# Patient Record
Sex: MALE | Race: WHITE | Employment: FULL TIME | URBAN - METROPOLITAN AREA
[De-identification: names, ages, dates, MRNs, and addresses within clinical notes are randomized per-mention and may not be internally consistent; named-entity substitution may affect disease eponyms.]

---

## 2018-05-25 ENCOUNTER — HOSPITAL ENCOUNTER (EMERGENCY)
Age: 48
Discharge: HOME | End: 2018-05-25
Payer: COMMERCIAL

## 2018-05-25 VITALS
DIASTOLIC BLOOD PRESSURE: 88 MMHG | RESPIRATION RATE: 16 BRPM | BODY MASS INDEX: 29.53 KG/M2 | OXYGEN SATURATION: 97 % | SYSTOLIC BLOOD PRESSURE: 125 MMHG | WEIGHT: 200 LBS | HEART RATE: 100 BPM | TEMPERATURE: 98.4 F

## 2018-05-25 DIAGNOSIS — J20.9 ACUTE BRONCHITIS, UNSPECIFIED ORGANISM: Primary | ICD-10-CM

## 2018-05-25 PROCEDURE — 99212 OFFICE O/P EST SF 10 MIN: CPT

## 2018-05-25 RX ORDER — AZITHROMYCIN 250 MG/1
TABLET, FILM COATED ORAL
Qty: 1 PACKET | Refills: 0 | Status: SHIPPED | OUTPATIENT
Start: 2018-05-25 | End: 2018-06-04

## 2018-05-25 RX ORDER — BENZONATATE 200 MG/1
200 CAPSULE ORAL 3 TIMES DAILY PRN
Qty: 15 CAPSULE | Refills: 0 | Status: SHIPPED | OUTPATIENT
Start: 2018-05-25 | End: 2019-03-21

## 2018-05-25 ASSESSMENT — PAIN DESCRIPTION - LOCATION: LOCATION: THROAT

## 2018-05-25 ASSESSMENT — PAIN DESCRIPTION - DESCRIPTORS: DESCRIPTORS: SORE

## 2018-05-25 ASSESSMENT — PAIN SCALES - GENERAL: PAINLEVEL_OUTOF10: 6

## 2018-11-18 ENCOUNTER — HOSPITAL ENCOUNTER (EMERGENCY)
Age: 48
Discharge: HOME | End: 2018-11-18
Payer: COMMERCIAL

## 2018-11-18 VITALS
DIASTOLIC BLOOD PRESSURE: 104 MMHG | WEIGHT: 210 LBS | SYSTOLIC BLOOD PRESSURE: 152 MMHG | TEMPERATURE: 98.7 F | OXYGEN SATURATION: 96 % | HEART RATE: 79 BPM | RESPIRATION RATE: 16 BRPM | BODY MASS INDEX: 31.01 KG/M2

## 2018-11-18 DIAGNOSIS — G43.809 OTHER MIGRAINE WITHOUT STATUS MIGRAINOSUS, NOT INTRACTABLE: Primary | ICD-10-CM

## 2018-11-18 PROCEDURE — 99212 OFFICE O/P EST SF 10 MIN: CPT

## 2018-11-18 PROCEDURE — 96372 THER/PROPH/DIAG INJ SC/IM: CPT

## 2018-11-18 PROCEDURE — 6370000000 HC RX 637 (ALT 250 FOR IP): Performed by: NURSE PRACTITIONER

## 2018-11-18 PROCEDURE — 6360000002 HC RX W HCPCS: Performed by: NURSE PRACTITIONER

## 2018-11-18 RX ORDER — DIPHENHYDRAMINE HCL 25 MG
25 TABLET ORAL ONCE
Status: COMPLETED | OUTPATIENT
Start: 2018-11-18 | End: 2018-11-18

## 2018-11-18 RX ORDER — KETOROLAC TROMETHAMINE 30 MG/ML
30 INJECTION, SOLUTION INTRAMUSCULAR; INTRAVENOUS ONCE
Status: COMPLETED | OUTPATIENT
Start: 2018-11-18 | End: 2018-11-18

## 2018-11-18 RX ORDER — METOCLOPRAMIDE 5 MG/1
10 TABLET ORAL ONCE
Status: COMPLETED | OUTPATIENT
Start: 2018-11-18 | End: 2018-11-18

## 2018-11-18 RX ADMIN — KETOROLAC TROMETHAMINE 30 MG: 30 INJECTION, SOLUTION INTRAMUSCULAR at 14:05

## 2018-11-18 RX ADMIN — DIPHENHYDRAMINE HCL 25 MG: 25 TABLET ORAL at 14:05

## 2018-11-18 RX ADMIN — METOCLOPRAMIDE HYDROCHLORIDE 10 MG: 5 TABLET ORAL at 14:05

## 2018-11-18 ASSESSMENT — PAIN DESCRIPTION - LOCATION
LOCATION: HEAD
LOCATION: HEAD

## 2018-11-18 ASSESSMENT — PAIN SCALES - GENERAL
PAINLEVEL_OUTOF10: 6
PAINLEVEL_OUTOF10: 6
PAINLEVEL_OUTOF10: 2

## 2018-11-18 ASSESSMENT — PAIN DESCRIPTION - PROGRESSION: CLINICAL_PROGRESSION: RAPIDLY IMPROVING

## 2018-11-18 ASSESSMENT — PAIN DESCRIPTION - DESCRIPTORS
DESCRIPTORS: HEADACHE
DESCRIPTORS: HEADACHE

## 2018-11-18 NOTE — ED PROVIDER NOTES
HPI:  11/18/18, Time: 2:30 PM         Nicole Contreras is a 57 y.o. male presenting to the ED for evaluation. He gets migraines. He said this current headache for 5 days it's on the top of his head where his migraines usually hit he said he's taken Maxalt and over-the-counter medicines and nothing is taking away the headache pain he thought maybe he had a sinus infection because sometimes he gets headaches and sinus infections however he does not have any cold symptoms does not have a sore throat does not of nasal discharge or congestion. He's denying any blurred vision, nausea vomiting dizziness confusion or any other complaints. He said it feels like a typical migraine for him it's been a gradual onset gradually getting worse      Review of Systems:   Pertinent positives and negatives are stated within HPI, all other systems reviewed and are negative.          --------------------------------------------- PAST HISTORY ---------------------------------------------  Past Medical History:  has a past medical history of Acid reflux and Hiatal hernia. Past Surgical History:  has a past surgical history that includes sinus surgery. Social History:  reports that he has never smoked. He does not have any smokeless tobacco history on file. He reports that he drinks about 0.6 oz of alcohol per week . He reports that he does not use drugs. Family History: family history is not on file. The patients home medications have been reviewed. Allergies: Levaquin [levofloxacin]    -------------------------------------------------- RESULTS -------------------------------------------------  All laboratory and radiology results have been personally reviewed by myself   LABS:  No results found for this visit on 11/18/18.     RADIOLOGY:  Interpreted by Radiologist.  No orders to display       ------------------------- NURSING NOTES AND VITALS REVIEWED ---------------------------   The nursing notes within the ED encounter and vital signs as below have been reviewed. BP (!) 152/104   Pulse 79   Temp 98.7 °F (37.1 °C) (Oral)   Resp 16   Wt 210 lb (95.3 kg)   SpO2 96%   BMI 31.01 kg/m²   Oxygen Saturation Interpretation: Normal      ---------------------------------------------------PHYSICAL EXAM--------------------------------------      Constitutional/General: Alert and oriented x3, well appearing, non toxic in NAD  Head: Normocephalic and atraumatic, no tenderness over the sinuses  Eyes: PERRL, EOMI  Mouth: Oropharynx clear, handling secretions, no trismus  Neck: Supple, full ROM, no meningeal signs, no lymphadenopathy  Pulmonary: Lungs clear to auscultation bilaterally, no wheezes, rales, or rhonchi. Not in respiratory distress  Cardiovascular:  Regular rate and rhythm, no murmurs, gallops, or rubs. 2+ distal pulses  Abdomen: Soft, non tender, non distended,   Extremities: Moves all extremities x 4. Warm and well perfused  Skin: warm and dry without rash  Neurologic: GCS 15,  Psych: Normal Affect      ------------------------------ ED COURSE/MEDICAL DECISION MAKING----------------------  Medications   ketorolac (TORADOL) injection 30 mg (30 mg Intramuscular Given 11/18/18 1405)   metoclopramide (REGLAN) tablet 10 mg (10 mg Oral Given 11/18/18 1405)   diphenhydrAMINE (BENADRYL) tablet 25 mg (25 mg Oral Given 11/18/18 1405)         ED COURSE:       Medical Decision Making:    Patient is had a migraine for 5 days he wanted to make sure it was in a sinus infection, I do not see any evidence of a sinus infection he has no nasal congestion he has no tenderness over his sinuses he is no drainage. He does get migraines. He said that it's wort his migraines always start on top of his head he states in his Maxalt and ibuprofen and it hasn't taken away the pain. I did give him Toradol 30 mg IM along with Reglan 10 and Benadryl 25.   His blood pressure is also elevated he said he has an appointment this Friday to have his

## 2019-03-21 ENCOUNTER — HOSPITAL ENCOUNTER (EMERGENCY)
Age: 49
Discharge: HOME | End: 2019-03-21
Payer: COMMERCIAL

## 2019-03-21 VITALS
WEIGHT: 210 LBS | OXYGEN SATURATION: 99 % | TEMPERATURE: 98.3 F | DIASTOLIC BLOOD PRESSURE: 88 MMHG | SYSTOLIC BLOOD PRESSURE: 125 MMHG | RESPIRATION RATE: 20 BRPM | HEART RATE: 71 BPM | BODY MASS INDEX: 31.01 KG/M2

## 2019-03-21 DIAGNOSIS — J20.9 ACUTE BRONCHITIS, UNSPECIFIED ORGANISM: ICD-10-CM

## 2019-03-21 DIAGNOSIS — J01.00 ACUTE NON-RECURRENT MAXILLARY SINUSITIS: Primary | ICD-10-CM

## 2019-03-21 PROCEDURE — 99212 OFFICE O/P EST SF 10 MIN: CPT

## 2019-03-21 RX ORDER — AMOXICILLIN AND CLAVULANATE POTASSIUM 875; 125 MG/1; MG/1
1 TABLET, FILM COATED ORAL 2 TIMES DAILY
Qty: 14 TABLET | Refills: 0 | Status: SHIPPED | OUTPATIENT
Start: 2019-03-21 | End: 2019-03-28

## 2019-03-21 RX ORDER — BENZONATATE 200 MG/1
200 CAPSULE ORAL 3 TIMES DAILY PRN
Qty: 15 CAPSULE | Refills: 0 | Status: SHIPPED | OUTPATIENT
Start: 2019-03-21 | End: 2019-12-02 | Stop reason: ALTCHOICE

## 2019-03-21 ASSESSMENT — PAIN SCALES - GENERAL: PAINLEVEL_OUTOF10: 4

## 2019-03-21 ASSESSMENT — PAIN DESCRIPTION - LOCATION: LOCATION: HEAD

## 2019-03-21 ASSESSMENT — PAIN DESCRIPTION - DESCRIPTORS: DESCRIPTORS: HEADACHE

## 2019-06-09 ENCOUNTER — HOSPITAL ENCOUNTER (EMERGENCY)
Age: 49
Discharge: HOME | End: 2019-06-09
Payer: COMMERCIAL

## 2019-06-09 VITALS
TEMPERATURE: 99.8 F | DIASTOLIC BLOOD PRESSURE: 78 MMHG | HEART RATE: 101 BPM | SYSTOLIC BLOOD PRESSURE: 118 MMHG | RESPIRATION RATE: 20 BRPM | BODY MASS INDEX: 31.01 KG/M2 | WEIGHT: 210 LBS | OXYGEN SATURATION: 98 %

## 2019-06-09 DIAGNOSIS — J01.90 ACUTE SINUSITIS, RECURRENCE NOT SPECIFIED, UNSPECIFIED LOCATION: Primary | ICD-10-CM

## 2019-06-09 PROCEDURE — 99212 OFFICE O/P EST SF 10 MIN: CPT

## 2019-06-09 RX ORDER — AMOXICILLIN AND CLAVULANATE POTASSIUM 875; 125 MG/1; MG/1
1 TABLET, FILM COATED ORAL 2 TIMES DAILY
Qty: 20 TABLET | Refills: 0 | Status: SHIPPED | OUTPATIENT
Start: 2019-06-09 | End: 2019-06-19

## 2019-06-09 RX ORDER — BROMPHENIRAMINE MALEATE, PSEUDOEPHEDRINE HYDROCHLORIDE, AND DEXTROMETHORPHAN HYDROBROMIDE 2; 30; 10 MG/5ML; MG/5ML; MG/5ML
10 SYRUP ORAL 4 TIMES DAILY PRN
Qty: 140 ML | Refills: 0 | Status: SHIPPED | OUTPATIENT
Start: 2019-06-09 | End: 2019-06-19

## 2019-06-09 NOTE — ED PROVIDER NOTES
Department of Emergency Medicine   [address] [place] [address]  Provider Note  Admit Date/RoomTime: 6/9/2019  9:06 AM  Room: 02/02  Chief Complaint   Sinusitis (x 1 week w c/o sinus pressure, congestion and drainage w sore throat - dry cough -fatigue/bodyaches)    History of Present Illness   Source of history provided by:  patient. History/Exam Limitations: none. Ari Armstrong is a 57 y.o. old male who has a past medical history of:   Past Medical History:   Diagnosis Date    Acid reflux     Hiatal hernia     Hyperlipidemia     Hypertension    presents to the urgent care center by private vehicle, for sinus pressure and pain and ear pressure sore throat drainage and cough. He's been sick for one week. He said that  He has now  started to feel achy and feverish. He does not have any neck stiffness chest pain or shortness of breath. ROS    Pertinent positives and negatives are stated within HPI, all other systems reviewed and are negative. Past Surgical History:   Procedure Laterality Date    SINUS SURGERY      2006   Social History:  reports that he has never smoked. He has never used smokeless tobacco. He reports that he drinks about 0.6 oz of alcohol per week. He reports that he does not use drugs. Family History: family history is not on file. Allergies: Levaquin [levofloxacin]    Physical Exam            ED Triage Vitals [06/09/19 0908]   BP Temp Temp Source Pulse Resp SpO2 Height Weight   118/78 99.8 °F (37.7 °C) Oral 101 20 98 % -- 210 lb (95.3 kg)      Oxygen Saturation Interpretation: Normal.    Constitutional:  Alert, development consistent with age. Ears:  External Ears: Bilateral normal.               TM's & External Canals: normal appearance, normal TMs bilaterally. Nose:   There is no discharge, swelling or lesions noted. Sinuses: mild Bilateral maxillary sinus tenderness. no Bilateral frontal sinus tenderness.   Mouth:  normal tongue and buccal mucosa. Throat: mild erythema. Airway Patent. Neck/Lymphatics:  Neck Supple. There is no  anterior cervical node tenderness. Respiratory:   Breath sounds: Bilateral normal.  Lung sounds: normal.   CV:  Regular rate and rhythm, normal heart sounds, without pathological murmurs, ectopy, gallops, or rubs. GI:  Abdomen Soft, nontender, good bowel sounds. No firm or pulsatile mass. Integument:  Normal turgor. Warm, dry, without visible rash. Neurological:  Oriented. Motor functions intact. Lab / Imaging Results   (All laboratory and radiology results have been personally reviewed by myself)  Labs:  No results found for this visit on 06/09/19. Imaging: All Radiology results interpreted by Radiologist unless otherwise noted. No orders to display     ED Course / Medical Decision Making   Medications - No data to display       MDM:      Patient here for evaluation-- has sinusitis. I did put him on Augmentin and Bromfed. If he doesn't improve or worsens he should get reevaluated     Plan of Care: Normal progression of disease discussed. All questions answered. Explained the rationale for symptomatic treatment  Instruction provided in the use of fluids, vaporizer, acetaminophen, and other OTC medication for symptom control. Extra fluids  Analgesics as needed, dose reviewed. Follow up as needed should symptoms fail to improve. Counseling:   I have  spoken with the patient and discussed todays results, in addition to providing specific details for the plan of care and counseling regarding the diagnosis and prognosis. Questions are answered at this time and they are agreeable with the plan. Assessment      1.  Acute sinusitis, recurrence not specified, unspecified location      Plan   Discharge to home and advised to contact Kyle Larry MD  [address] [facility]  [phone]      As needed   Patient condition is good    New Medications     New Prescriptions    AMOXICILLIN-CLAVULANATE (AUGMENTIN) 875-125 MG PER TABLET    Take 1 tablet by mouth 2 times daily for 10 days    BROMPHENIRAMINE-PSEUDOEPHEDRINE-DM 2-30-10 MG/5ML SYRUP    Take 10 mLs by mouth 4 times daily as needed for Congestion or Cough     Electronically signed by UMAIR Quiros CNP   DD: 6/9/19  **This report was transcribed using voice recognition software. Every effort was made to ensure accuracy; however, inadvertent computerized transcription errors may be present.   END OF ED PROVIDER NOTE     UMAIR Quiros CNP  06/09/19 [id number]

## 2019-07-24 LAB
AVERAGE GLUCOSE: NORMAL
CHOLESTEROL, TOTAL: 188 MG/DL
CHOLESTEROL/HDL RATIO: 4.1
CREATININE: 1.1 MG/DL
HBA1C MFR BLD: 5.3 %
HDLC SERPL-MCNC: 46 MG/DL (ref 35–70)
LDL CHOLESTEROL CALCULATED: 101 MG/DL (ref 0–160)
NONHDLC SERPL-MCNC: NORMAL MG/DL
POTASSIUM (K+): 4.3
TRIGL SERPL-MCNC: 204 MG/DL
VLDLC SERPL CALC-MCNC: NORMAL MG/DL

## 2019-12-02 ENCOUNTER — HOSPITAL ENCOUNTER (EMERGENCY)
Age: 49
Discharge: HOME | End: 2019-12-02
Payer: COMMERCIAL

## 2019-12-02 VITALS
RESPIRATION RATE: 18 BRPM | WEIGHT: 215 LBS | DIASTOLIC BLOOD PRESSURE: 90 MMHG | HEART RATE: 65 BPM | BODY MASS INDEX: 31.84 KG/M2 | TEMPERATURE: 98.6 F | OXYGEN SATURATION: 96 % | SYSTOLIC BLOOD PRESSURE: 133 MMHG | HEIGHT: 69 IN

## 2019-12-02 DIAGNOSIS — M54.32 SCIATICA OF LEFT SIDE: Primary | ICD-10-CM

## 2019-12-02 PROCEDURE — 99212 OFFICE O/P EST SF 10 MIN: CPT

## 2019-12-02 RX ORDER — NAPROXEN 500 MG/1
500 TABLET ORAL 2 TIMES DAILY PRN
Qty: 20 TABLET | Refills: 0 | Status: SHIPPED | OUTPATIENT
Start: 2019-12-02 | End: 2021-02-15 | Stop reason: ALTCHOICE

## 2019-12-02 RX ORDER — CYCLOBENZAPRINE HCL 10 MG
10 TABLET ORAL 2 TIMES DAILY PRN
Qty: 14 TABLET | Refills: 0 | Status: SHIPPED | OUTPATIENT
Start: 2019-12-02 | End: 2019-12-09

## 2019-12-02 RX ORDER — METHYLPREDNISOLONE 4 MG/1
TABLET ORAL
Qty: 1 KIT | Refills: 0 | Status: SHIPPED | OUTPATIENT
Start: 2019-12-02 | End: 2019-12-08

## 2019-12-02 ASSESSMENT — PAIN DESCRIPTION - ONSET: ONSET: GRADUAL

## 2019-12-02 ASSESSMENT — PAIN SCALES - GENERAL: PAINLEVEL_OUTOF10: 8

## 2019-12-02 ASSESSMENT — PAIN DESCRIPTION - DESCRIPTORS: DESCRIPTORS: ACHING;SHOOTING

## 2019-12-02 ASSESSMENT — PAIN DESCRIPTION - FREQUENCY: FREQUENCY: CONTINUOUS

## 2019-12-02 ASSESSMENT — PAIN DESCRIPTION - PROGRESSION: CLINICAL_PROGRESSION: GRADUALLY WORSENING

## 2019-12-02 ASSESSMENT — PAIN DESCRIPTION - LOCATION: LOCATION: BACK;LEG

## 2019-12-02 ASSESSMENT — PAIN DESCRIPTION - ORIENTATION: ORIENTATION: LEFT;LOWER

## 2019-12-02 ASSESSMENT — PAIN DESCRIPTION - PAIN TYPE: TYPE: ACUTE PAIN

## 2020-02-26 LAB
CHOLESTEROL, TOTAL: 221 MG/DL
CHOLESTEROL/HDL RATIO: 5.1
CREATININE: 1.1 MG/DL
HDLC SERPL-MCNC: 43 MG/DL (ref 35–70)
LDL CHOLESTEROL CALCULATED: 128 MG/DL (ref 0–160)
NONHDLC SERPL-MCNC: NORMAL MG/DL
POTASSIUM (K+): 4.1
TRIGL SERPL-MCNC: 252 MG/DL
VLDLC SERPL CALC-MCNC: 50 MG/DL

## 2020-06-17 ENCOUNTER — TELEPHONE (OUTPATIENT)
Dept: SLEEP CENTER | Age: 50
End: 2020-06-17

## 2020-07-06 ENCOUNTER — TELEPHONE (OUTPATIENT)
Dept: SLEEP CENTER | Age: 50
End: 2020-07-06

## 2020-08-26 VITALS
RESPIRATION RATE: 17 BRPM | BODY MASS INDEX: 32.88 KG/M2 | DIASTOLIC BLOOD PRESSURE: 74 MMHG | SYSTOLIC BLOOD PRESSURE: 110 MMHG | HEART RATE: 68 BPM | WEIGHT: 222 LBS | HEIGHT: 69 IN

## 2021-01-05 ENCOUNTER — TELEPHONE (OUTPATIENT)
Dept: FAMILY MEDICINE CLINIC | Age: 51
End: 2021-01-05

## 2021-02-15 ENCOUNTER — APPOINTMENT (OUTPATIENT)
Dept: GENERAL RADIOLOGY | Age: 51
End: 2021-02-15
Payer: COMMERCIAL

## 2021-02-15 ENCOUNTER — HOSPITAL ENCOUNTER (OUTPATIENT)
Age: 51
Setting detail: OBSERVATION
Discharge: HOME | End: 2021-02-16
Attending: EMERGENCY MEDICINE | Admitting: INTERNAL MEDICINE
Payer: COMMERCIAL

## 2021-02-15 DIAGNOSIS — R07.9 CHEST PAIN, UNSPECIFIED TYPE: Primary | ICD-10-CM

## 2021-02-15 LAB
ALBUMIN SERPL-MCNC: 4.5 G/DL (ref 3.5–5.2)
ALP BLD-CCNC: 62 U/L (ref 40–129)
ALT SERPL-CCNC: 35 U/L (ref 0–40)
ANION GAP SERPL CALCULATED.3IONS-SCNC: 12 MMOL/L (ref 7–16)
AST SERPL-CCNC: 31 U/L (ref 0–39)
BASOPHILS ABSOLUTE: 0.1 E9/L (ref 0–0.2)
BASOPHILS RELATIVE PERCENT: 1.3 % (ref 0–2)
BILIRUB SERPL-MCNC: 0.4 MG/DL (ref 0–1.2)
BUN BLDV-MCNC: 13 MG/DL (ref 6–20)
CALCIUM SERPL-MCNC: 9.6 MG/DL (ref 8.6–10.2)
CHLORIDE BLD-SCNC: 101 MMOL/L (ref 98–107)
CO2: 26 MMOL/L (ref 22–29)
CREAT SERPL-MCNC: 1.1 MG/DL (ref 0.7–1.2)
D DIMER: <200 NG/ML DDU
EKG ATRIAL RATE: 74 BPM
EKG ATRIAL RATE: 78 BPM
EKG P AXIS: 21 DEGREES
EKG P AXIS: 29 DEGREES
EKG P-R INTERVAL: 124 MS
EKG P-R INTERVAL: 126 MS
EKG Q-T INTERVAL: 344 MS
EKG Q-T INTERVAL: 348 MS
EKG QRS DURATION: 100 MS
EKG QRS DURATION: 102 MS
EKG QTC CALCULATION (BAZETT): 381 MS
EKG QTC CALCULATION (BAZETT): 396 MS
EKG R AXIS: 12 DEGREES
EKG R AXIS: 5 DEGREES
EKG T AXIS: 12 DEGREES
EKG T AXIS: 6 DEGREES
EKG VENTRICULAR RATE: 74 BPM
EKG VENTRICULAR RATE: 78 BPM
EOSINOPHILS ABSOLUTE: 0.11 E9/L (ref 0.05–0.5)
EOSINOPHILS RELATIVE PERCENT: 1.5 % (ref 0–6)
GFR AFRICAN AMERICAN: >60
GFR NON-AFRICAN AMERICAN: >60 ML/MIN/1.73
GLUCOSE BLD-MCNC: 109 MG/DL (ref 74–99)
HBA1C MFR BLD: 5.5 % (ref 4–5.6)
HCT VFR BLD CALC: 43.7 % (ref 37–54)
HEMOGLOBIN: 15.4 G/DL (ref 12.5–16.5)
IMMATURE GRANULOCYTES #: 0.03 E9/L
IMMATURE GRANULOCYTES %: 0.4 % (ref 0–5)
LYMPHOCYTES ABSOLUTE: 1.73 E9/L (ref 1.5–4)
LYMPHOCYTES RELATIVE PERCENT: 23 % (ref 20–42)
MCH RBC QN AUTO: 31.2 PG (ref 26–35)
MCHC RBC AUTO-ENTMCNC: 35.2 % (ref 32–34.5)
MCV RBC AUTO: 88.6 FL (ref 80–99.9)
MONOCYTES ABSOLUTE: 0.87 E9/L (ref 0.1–0.95)
MONOCYTES RELATIVE PERCENT: 11.6 % (ref 2–12)
NEUTROPHILS ABSOLUTE: 4.69 E9/L (ref 1.8–7.3)
NEUTROPHILS RELATIVE PERCENT: 62.2 % (ref 43–80)
PDW BLD-RTO: 12.5 FL (ref 11.5–15)
PLATELET # BLD: 314 E9/L (ref 130–450)
PMV BLD AUTO: 9.5 FL (ref 7–12)
POTASSIUM REFLEX MAGNESIUM: 4.2 MMOL/L (ref 3.5–5)
RBC # BLD: 4.93 E12/L (ref 3.8–5.8)
SODIUM BLD-SCNC: 139 MMOL/L (ref 132–146)
T4 FREE: 1.39 NG/DL (ref 0.93–1.7)
TOTAL PROTEIN: 7.5 G/DL (ref 6.4–8.3)
TROPONIN: <0.01 NG/ML (ref 0–0.03)
TSH SERPL DL<=0.05 MIU/L-ACNC: 1.77 UIU/ML (ref 0.27–4.2)
WBC # BLD: 7.5 E9/L (ref 4.5–11.5)

## 2021-02-15 PROCEDURE — 84484 ASSAY OF TROPONIN QUANT: CPT

## 2021-02-15 PROCEDURE — 71045 X-RAY EXAM CHEST 1 VIEW: CPT

## 2021-02-15 PROCEDURE — 93005 ELECTROCARDIOGRAM TRACING: CPT | Performed by: STUDENT IN AN ORGANIZED HEALTH CARE EDUCATION/TRAINING PROGRAM

## 2021-02-15 PROCEDURE — 6370000000 HC RX 637 (ALT 250 FOR IP): Performed by: NURSE PRACTITIONER

## 2021-02-15 PROCEDURE — 6360000002 HC RX W HCPCS: Performed by: NURSE PRACTITIONER

## 2021-02-15 PROCEDURE — 36415 COLL VENOUS BLD VENIPUNCTURE: CPT

## 2021-02-15 PROCEDURE — 99204 OFFICE O/P NEW MOD 45 MIN: CPT | Performed by: INTERNAL MEDICINE

## 2021-02-15 PROCEDURE — 84443 ASSAY THYROID STIM HORMONE: CPT

## 2021-02-15 PROCEDURE — 96372 THER/PROPH/DIAG INJ SC/IM: CPT

## 2021-02-15 PROCEDURE — 84439 ASSAY OF FREE THYROXINE: CPT

## 2021-02-15 PROCEDURE — 80053 COMPREHEN METABOLIC PANEL: CPT

## 2021-02-15 PROCEDURE — 83036 HEMOGLOBIN GLYCOSYLATED A1C: CPT

## 2021-02-15 PROCEDURE — 99283 EMERGENCY DEPT VISIT LOW MDM: CPT

## 2021-02-15 PROCEDURE — 2580000003 HC RX 258: Performed by: NURSE PRACTITIONER

## 2021-02-15 PROCEDURE — 85025 COMPLETE CBC W/AUTO DIFF WBC: CPT

## 2021-02-15 PROCEDURE — G0378 HOSPITAL OBSERVATION PER HR: HCPCS

## 2021-02-15 PROCEDURE — 99220 PR INITIAL OBSERVATION CARE/DAY 70 MINUTES: CPT | Performed by: INTERNAL MEDICINE

## 2021-02-15 PROCEDURE — APPSS45 APP SPLIT SHARED TIME 31-45 MINUTES: Performed by: NURSE PRACTITIONER

## 2021-02-15 PROCEDURE — 85378 FIBRIN DEGRADE SEMIQUANT: CPT

## 2021-02-15 PROCEDURE — 6370000000 HC RX 637 (ALT 250 FOR IP): Performed by: STUDENT IN AN ORGANIZED HEALTH CARE EDUCATION/TRAINING PROGRAM

## 2021-02-15 RX ORDER — GAUZE BANDAGE 2" X 2"
100 BANDAGE TOPICAL DAILY
COMMUNITY

## 2021-02-15 RX ORDER — POLYETHYLENE GLYCOL 3350 17 G/17G
17 POWDER, FOR SOLUTION ORAL DAILY PRN
Status: DISCONTINUED | OUTPATIENT
Start: 2021-02-15 | End: 2021-02-16 | Stop reason: HOSPADM

## 2021-02-15 RX ORDER — PROPRANOLOL HYDROCHLORIDE 10 MG/1
TABLET ORAL
COMMUNITY
End: 2021-02-15 | Stop reason: SDUPTHER

## 2021-02-15 RX ORDER — OMEGA-3/DHA/EPA/FISH OIL 60 MG-90MG
500 CAPSULE ORAL DAILY
COMMUNITY

## 2021-02-15 RX ORDER — PANTOPRAZOLE SODIUM 40 MG/1
40 TABLET, DELAYED RELEASE ORAL
Status: DISCONTINUED | OUTPATIENT
Start: 2021-02-16 | End: 2021-02-16 | Stop reason: HOSPADM

## 2021-02-15 RX ORDER — M-VIT,TX,IRON,MINS/CALC/FOLIC 27MG-0.4MG
1 TABLET ORAL DAILY
COMMUNITY

## 2021-02-15 RX ORDER — GAUZE BANDAGE 2" X 2"
100 BANDAGE TOPICAL DAILY
Status: DISCONTINUED | OUTPATIENT
Start: 2021-02-16 | End: 2021-02-16 | Stop reason: HOSPADM

## 2021-02-15 RX ORDER — NITROGLYCERIN 0.4 MG/1
0.4 TABLET SUBLINGUAL EVERY 5 MIN PRN
Status: DISCONTINUED | OUTPATIENT
Start: 2021-02-15 | End: 2021-02-16 | Stop reason: HOSPADM

## 2021-02-15 RX ORDER — M-VIT,TX,IRON,MINS/CALC/FOLIC 27MG-0.4MG
1 TABLET ORAL DAILY
Status: DISCONTINUED | OUTPATIENT
Start: 2021-02-16 | End: 2021-02-16 | Stop reason: HOSPADM

## 2021-02-15 RX ORDER — PROPRANOLOL HYDROCHLORIDE 10 MG/1
10 TABLET ORAL 3 TIMES DAILY
Qty: 90 TABLET | Refills: 3 | Status: SHIPPED
Start: 2021-02-15 | End: 2021-06-28 | Stop reason: SDUPTHER

## 2021-02-15 RX ORDER — ONDANSETRON 2 MG/ML
4 INJECTION INTRAMUSCULAR; INTRAVENOUS EVERY 6 HOURS PRN
Status: DISCONTINUED | OUTPATIENT
Start: 2021-02-15 | End: 2021-02-16 | Stop reason: HOSPADM

## 2021-02-15 RX ORDER — PRAVASTATIN SODIUM 20 MG
20 TABLET ORAL DAILY
Qty: 90 TABLET | Refills: 1 | Status: SHIPPED
Start: 2021-02-15 | End: 2021-08-17

## 2021-02-15 RX ORDER — ACETAMINOPHEN 325 MG/1
650 TABLET ORAL EVERY 6 HOURS PRN
Status: DISCONTINUED | OUTPATIENT
Start: 2021-02-15 | End: 2021-02-16 | Stop reason: HOSPADM

## 2021-02-15 RX ORDER — ACETAMINOPHEN 650 MG/1
650 SUPPOSITORY RECTAL EVERY 6 HOURS PRN
Status: DISCONTINUED | OUTPATIENT
Start: 2021-02-15 | End: 2021-02-16 | Stop reason: HOSPADM

## 2021-02-15 RX ORDER — PROPRANOLOL HYDROCHLORIDE 10 MG/1
30 TABLET ORAL DAILY
Status: DISCONTINUED | OUTPATIENT
Start: 2021-02-16 | End: 2021-02-16 | Stop reason: HOSPADM

## 2021-02-15 RX ORDER — HYDROCODONE BITARTRATE AND ACETAMINOPHEN 5; 325 MG/1; MG/1
1 TABLET ORAL ONCE
Status: COMPLETED | OUTPATIENT
Start: 2021-02-15 | End: 2021-02-15

## 2021-02-15 RX ORDER — PRAVASTATIN SODIUM 20 MG
20 TABLET ORAL DAILY
Status: DISCONTINUED | OUTPATIENT
Start: 2021-02-16 | End: 2021-02-16 | Stop reason: HOSPADM

## 2021-02-15 RX ORDER — OMEGA-3/DHA/EPA/FISH OIL 60 MG-90MG
500 CAPSULE ORAL DAILY
Status: DISCONTINUED | OUTPATIENT
Start: 2021-02-15 | End: 2021-02-15

## 2021-02-15 RX ORDER — PROMETHAZINE HYDROCHLORIDE 25 MG/1
12.5 TABLET ORAL EVERY 6 HOURS PRN
Status: DISCONTINUED | OUTPATIENT
Start: 2021-02-15 | End: 2021-02-16 | Stop reason: HOSPADM

## 2021-02-15 RX ORDER — ASPIRIN 81 MG/1
81 TABLET, CHEWABLE ORAL DAILY
Status: DISCONTINUED | OUTPATIENT
Start: 2021-02-16 | End: 2021-02-16 | Stop reason: HOSPADM

## 2021-02-15 RX ORDER — SODIUM CHLORIDE 0.9 % (FLUSH) 0.9 %
10 SYRINGE (ML) INJECTION EVERY 12 HOURS SCHEDULED
Status: DISCONTINUED | OUTPATIENT
Start: 2021-02-15 | End: 2021-02-16 | Stop reason: HOSPADM

## 2021-02-15 RX ORDER — ASPIRIN 81 MG/1
324 TABLET, CHEWABLE ORAL ONCE
Status: COMPLETED | OUTPATIENT
Start: 2021-02-15 | End: 2021-02-15

## 2021-02-15 RX ORDER — SODIUM CHLORIDE 0.9 % (FLUSH) 0.9 %
10 SYRINGE (ML) INJECTION PRN
Status: DISCONTINUED | OUTPATIENT
Start: 2021-02-15 | End: 2021-02-16 | Stop reason: HOSPADM

## 2021-02-15 RX ADMIN — ASPIRIN 324 MG: 81 TABLET, CHEWABLE ORAL at 12:21

## 2021-02-15 RX ADMIN — HYDROCODONE BITARTRATE AND ACETAMINOPHEN 1 TABLET: 5; 325 TABLET ORAL at 17:17

## 2021-02-15 RX ADMIN — ENOXAPARIN SODIUM 40 MG: 40 INJECTION SUBCUTANEOUS at 14:45

## 2021-02-15 RX ADMIN — ACETAMINOPHEN 650 MG: 325 TABLET, FILM COATED ORAL at 15:34

## 2021-02-15 RX ADMIN — SODIUM CHLORIDE, PRESERVATIVE FREE 10 ML: 5 INJECTION INTRAVENOUS at 20:34

## 2021-02-15 RX ADMIN — NITROGLYCERIN 0.4 MG: 0.4 TABLET SUBLINGUAL at 10:38

## 2021-02-15 ASSESSMENT — ENCOUNTER SYMPTOMS
DIARRHEA: 0
RHINORRHEA: 0
ABDOMINAL PAIN: 0
COUGH: 0
EYE PAIN: 0
WHEEZING: 0
VOMITING: 0
NAUSEA: 0
SHORTNESS OF BREATH: 0

## 2021-02-15 ASSESSMENT — PAIN DESCRIPTION - LOCATION: LOCATION: CHEST

## 2021-02-15 ASSESSMENT — PAIN DESCRIPTION - ORIENTATION: ORIENTATION: MID;UPPER

## 2021-02-15 ASSESSMENT — PAIN DESCRIPTION - PAIN TYPE: TYPE: ACUTE PAIN

## 2021-02-15 ASSESSMENT — PAIN DESCRIPTION - FREQUENCY: FREQUENCY: CONTINUOUS

## 2021-02-15 NOTE — CONSULTS
INPATIENT CARDIOLOGY CONSULT    Name: Marcie Mason    Age: 52 y.o. Date of Admission: 2/15/2021 10:20 AM    Date of Service: 2/15/2021    Reason for Consultation: Chest pain    Referring Physician: Ana Cristina Al MD    Primary Cardiologist: none, known to me from this admission    History of Present Illness:   Marcie Mason is a 52 y.o. male (with no prior association to [id number] [place] [address]) who presented on 2/15/2021 for further evaluation of chest pain. He has history of hypertension, hyperlipidemia, Lea's esophagus, but no prior cardiac history. He works as a , does not routinely exercise. Last night was on the couch watching TV when he developed relatively sudden onset of substernal chest heaviness, described as somebody sitting on his chest, some radiation to the back. This was worsened with deep breathing and with certain twisting motions of the thorax. There was no associated nausea or diaphoresis or dyspnea, but he is having trouble getting a deep breath. This lasted continuously throughout the night, was still present this morning when he woke up it was less severe. Came in for evaluation, in the ER did have some nausea and sweats. Nitroglycerin did relieve the pain somewhat, currently chest pain-free. Initial troponin negative and EKG is normal.  Reports a stress test quite a few years ago. Review of Systems:   Complete review of systems negative except as described above.     Past Medical History:  Past Medical History:   Diagnosis Date    Acid reflux     Lea esophagus     Hiatal hernia     Hyperlipidemia     Hypertension        Past Surgical History:  Past Surgical History:   Procedure Laterality Date    COLONOSCOPY  10/2017   [facility] SINUS SURGERY      2006    UPPER GASTROINTESTINAL ENDOSCOPY  2019       Family History:  Family History   Problem Relation Age of Onset   Sherrine Tavares Disease Mother     Esophageal Cancer Father          age 47 Social History:  Social History     Tobacco Use    Smoking status: Never Smoker    Smokeless tobacco: Never Used   Substance Use Topics    Alcohol use: Yes     Alcohol/week: 1.0 standard drinks     Types: 1 Cans of beer per week    Drug use: No       Allergies: Allergies   Allergen Reactions    Levaquin [Levofloxacin] Other (See Comments)     joint pain       Home Medications:  Prior to Admission medications    Medication Sig Start Date End Date Taking? Authorizing Provider   propranolol (INDERAL) 10 MG tablet Take 1 tablet by mouth 3 times daily  Patient taking differently: Take 30 mg by mouth daily  2/15/21  Yes Anderson Dale MD   pravastatin (PRAVACHOL) 20 MG tablet Take 1 tablet by mouth daily 2/15/21  Yes Anderson Dale MD   Multiple Vitamins-Minerals (THERAPEUTIC MULTIVITAMIN-MINERALS) tablet Take 1 tablet by mouth daily   Yes Historical Provider, MD   thiamine mononitrate (GNP VITAMIN B-1) 100 MG tablet Take 100 mg by mouth daily   Yes Historical Provider, MD   Omega-3 Fatty Acids (FISH OIL) 500 MG CAPS Take 500 mg by mouth daily   Yes Historical Provider, MD   dexlansoprazole (DEXILANT) 60 MG CPDR capsule Take 60 mg by mouth daily.      Yes Historical Provider, MD       Current Medications:    Current Facility-Administered Medications:     nitroGLYCERIN (NITROSTAT) SL tablet 0.4 mg, 0.4 mg, Sublingual, Q5 Min PRN, Elida Dang DO, 0.4 mg at 02/15/21 [facility]    influenza quadrivalent split vaccine (FLUZONE;FLUARIX;FLULAVAL;AFLURIA) injection 0.5 mL, 0.5 mL, Intramuscular, Prior to discharge, Suman Holland DO    [START ON 2/16/2021] pantoprazole (PROTONIX) tablet 40 mg, 40 mg, Oral, QAM AC, UMAIR Viera CNP    [START ON 2/16/2021] therapeutic multivitamin-minerals 1 tablet, 1 tablet, Oral, Daily, UMAIR Viera CNP  [START ON 2/16/2021] pravastatin (PRAVACHOL) tablet 20 mg, 20 mg, Oral, Daily, UMAIR Viera CNP    [START ON 2/16/2021] propranolol (INDERAL) tablet 30 mg, 30 mg, Oral, Daily, UMAIR Bettencourt CNP  [START ON 2/16/2021] thiamine mononitrate tablet 100 mg, 100 mg, Oral, Daily, UMAIR Bettencourt CNP    sodium chloride flush 0.9 % injection 10 mL, 10 mL, Intravenous, 2 times per day, UMAIR Bettencourt CNP    sodium chloride flush 0.9 % injection 10 mL, 10 mL, Intravenous, PRN, UMAIR Bettencourt CNP    promethazine (PHENERGAN) tablet 12.5 mg, 12.5 mg, Oral, Q6H PRN **OR** ondansetron (ZOFRAN) injection 4 mg, 4 mg, Intravenous, Q6H PRN, UMAIR Bettencourt CNP    acetaminophen (TYLENOL) tablet 650 mg, 650 mg, Oral, Q6H PRN **OR** acetaminophen (TYLENOL) suppository 650 mg, 650 mg, Rectal, Q6H PRN, UMAIR Bettencourt CNP    polyethylene glycol (GLYCOLAX) packet 17 g, 17 g, Oral, Daily PRN, UMAIR Bettencourt CNP  [START ON 2/16/2021] aspirin chewable tablet 81 mg, 81 mg, Oral, Daily, UMAIR Bettencourt CNP    enoxaparin (LOVENOX) injection 40 mg, 40 mg, Subcutaneous, Daily, UMAIR Bettencourt CNP    perflutren lipid microspheres (DEFINITY) injection 1.65 mg, 1.5 mL, Intravenous, ONCE PRN, UMAIR Bettencourt CNP    Physical Exam:  BP (!) 137/92   Pulse 81   Temp 98.9 °F (37.2 °C) (Oral)   Resp 18   Ht 5' 9\" (1.753 m)   Wt 220 lb (99.8 kg)   SpO2 99%   BMI 32.49 kg/m²   Wt Readings from Last 3 Encounters:   02/15/21 220 lb (99.8 kg)   03/10/20 222 lb (100.7 kg)   12/02/19 215 lb (97.5 kg)     Appearance: Awake, alert, no acute respiratory distress  Skin: Intact, no rash  Head: Normocephalic, atraumatic  Eyes: EOMI, no conjunctival erythema  ENMT: No pharyngeal erythema, MMM, no rhinorrhea  Neck: Supple, no elevated JVP, no carotid bruits  Lungs: Clear to auscultation bilaterally. No wheezes, rales, or rhonchi. Cardiac: PMI nondisplaced, regular rhythm with a normal rate, normal S1 & S2, no murmurs. No chest wall tenderness.   Abdomen: Soft, nontender, +bowel sounds  Extremities: Moves all extremities x 4, no lower extremity edema  Neurologic: No focal motor deficits apparent, normal mood and affect  Peripheral Pulses: Intact posterior tibial pulses bilaterally    Intake/Output:  No intake or output data in the 24 hours ending 02/15/21 1344  No intake/output data recorded. Laboratory Tests:  Recent Labs     02/15/21  1037      K 4.2      CO2 26   BUN 13   CREATININE 1.1   GLUCOSE 109*   CALCIUM 9.6     No results found for: MG  Recent Labs     02/15/21  1037   ALKPHOS 62   ALT 35   AST 31   PROT 7.5   BILITOT 0.4   LABALBU 4.5     Recent Labs     02/15/21  1037   WBC 7.5   RBC 4.93   HGB 15.4   HCT 43.7   MCV 88.6   MCH 31.2   MCHC 35.2*   RDW 12.5      MPV 9.5     Lab Results   Component Value Date    TROPONINI <0.01 02/15/2021     No results found for: INR, PROTIME  Lab Results   Component Value Date    TSH 1.240 04/06/2016     Lab Results   Component Value Date    LABA1C 5.3 07/24/2019     No results found for: EAG  Lab Results   Component Value Date    CHOL 221 02/26/2020    CHOL 188 07/24/2019    CHOL 233 (H) 03/17/2017     Lab Results   Component Value Date    TRIG 252 02/26/2020    TRIG 204 07/24/2019    TRIG 118 03/17/2017     Lab Results   Component Value Date    HDL 43 02/26/2020    HDL 46 07/24/2019    HDL 56 03/17/2017     Lab Results   Component Value Date    LDLCALC 128 02/26/2020    LDLCALC 101 07/24/2019    LDLCALC 153 (H) 03/17/2017     Lab Results   Component Value Date    LABVLDL 24 03/17/2017    LABVLDL 36 04/06/2016    LABVLDL 31 05/18/2015    VLDL 50 02/26/2020     Lab Results   Component Value Date    CHOLHDLRATIO 5.1 02/26/2020    CHOLHDLRATIO 4.1 07/24/2019     No results for input(s): PROBNP in the last 72 hours. Cardiac Tests:  EKG: All personally reviewed  2/15/2021 at 1239: Sinus rhythm 70 beats minute. Normal axis normal intervals. No ST-T wave changes.     2/15/2021 at 1025: Sinus rhythm 74 bpm.  Normal axis normal intervals. No ST-T wave changes. Telemetry: Sinus rhythm 80s    Chest X-ray:   2/15/2021  FINDINGS:   The lungs are without acute focal process.  There is no effusion or   pneumothorax. The cardiomediastinal silhouette is without acute process. The   osseous structures are without acute process.       Impression   No acute process. Echocardiogram:     Stress test: Remote    Cardiac catheterization: None    -------------------------------------------------------------------------------------------------------------------------------------------------------------  IMPRESSION:  1. Atypical chest pain. Initial troponin negative, EKG unremarkable. Currently chest pain-free. 2. Hypertension, running high  3. Hyperlipidemia  4. GERD/Lea's esophagus/hiatal hernia  5. Obesity BMI 32.5 kg/m²    RECOMMENDATIONS:     Trend troponins   Check a D-dimer   If unremarkable proceed with exercise nuclear stress test tomorrow   Aggressive risk factor modification    Thank you for allowing me to participate in your patient's care. Please feel free to contact me if you have any questions or concerns. Paulo Castillo MD, [facility] Cardiology    NOTE: This report was transcribed using voice recognition software. Every effort was made to ensure accuracy; however, inadvertent computerized transcription errors may be present.

## 2021-02-15 NOTE — ED PROVIDER NOTES
Musculoskeletal: Normal range of motion. No neck rigidity. Thyroid: No thyromegaly. Vascular: No JVD. Cardiovascular:      Rate and Rhythm: Normal rate and regular rhythm. Heart sounds: Normal heart sounds. Pulmonary:      Effort: Pulmonary effort is normal. No respiratory distress. Breath sounds: Normal breath sounds. No wheezing, rhonchi or rales. Chest:      Chest wall: No tenderness. Abdominal:      General: Abdomen is flat. There is no distension. Palpations: Abdomen is soft. There is no mass. Tenderness: There is no abdominal tenderness. Skin:     General: Skin is warm and dry. Findings: No rash. Neurological:      General: No focal deficit present. Mental Status: He is alert. Procedures     [facility]     ED Course as of Feb 15 1203   Mon Feb 15, 2021   1046 Patient was given sublingual nitro and states his pain has relieved. [WL]   [phone] with hospitalist, he agrees to admit the patient.    [WL]      ED Course User Index  [WL] Stephanie Erwin DO        Patient presents to the ED for evaluation. This patient was seen and evaluated with the attending. Work-up was performed with concerns for but not limited to Pneumonia, PE and ACS, arrhythmia  Patient evaluated for chest pain. Troponin not elevated. Chest x-ray also unremarkable. Patient is a medium to high risk heart score and was treated for cardiac etiology to his chest pain with aspirin and had complete resolution of his chest pain with nitro leading me to think this is more cardiac in nature. He has not had a stress test or echo performed in a few years and would benefit from a chest pain work-up upon admission. Patient requires continued workup and management of their symptoms and will be admitted to the hospital for further evaluation and treatment. EKG read by me. Normal sinus rhythm. T wave inversion in lead III. No STEMI.   No significant changes when compared to prior EKG.      --------------------------------------------- PAST HISTORY ---------------------------------------------  Past Medical History:  has a past medical history of Acid reflux, Lea esophagus, Hiatal hernia, Hyperlipidemia, and Hypertension. Past Surgical History:  has a past surgical history that includes sinus surgery; Colonoscopy (10/2017); and Upper gastrointestinal endoscopy (11/2019). Social History:  reports that he has never smoked. He has never used smokeless tobacco. He reports current alcohol use of about 1.0 standard drinks of alcohol per week. He reports that he does not use drugs. Family History: family history includes Esophageal Cancer in his father; Graves Disease in his mother. The patients home medications have been reviewed.     Allergies: Levaquin [levofloxacin]    -------------------------------------------------- RESULTS -------------------------------------------------    LABS:  Results for orders placed or performed during the hospital encounter of 02/15/21   CBC Auto Differential   Result Value Ref Range    WBC 7.5 4.5 - 11.5 E9/L    RBC 4.93 3.80 - 5.80 E12/L    Hemoglobin 15.4 12.5 - 16.5 g/dL    Hematocrit 43.7 37.0 - 54.0 %    MCV 88.6 80.0 - 99.9 fL    MCH 31.2 26.0 - 35.0 pg    MCHC 35.2 (H) 32.0 - 34.5 %    RDW 12.5 11.5 - 15.0 fL    Platelets 618 368 - 715 E9/L    MPV 9.5 7.0 - 12.0 fL    Neutrophils % 62.2 43.0 - 80.0 %    Immature Granulocytes % 0.4 0.0 - 5.0 %    Lymphocytes % 23.0 20.0 - 42.0 %    Monocytes % 11.6 2.0 - 12.0 %    Eosinophils % 1.5 0.0 - 6.0 %    Basophils % 1.3 0.0 - 2.0 %    Neutrophils Absolute 4.69 1.80 - 7.30 E9/L    Immature Granulocytes # 0.03 E9/L    Lymphocytes Absolute 1.73 1.50 - 4.00 E9/L    Monocytes Absolute 0.87 0.10 - 0.95 E9/L    Eosinophils Absolute 0.11 0.05 - 0.50 E9/L    Basophils Absolute 0.10 0.00 - 0.20 E9/L   Troponin   Result Value Ref Range    Troponin <0.01 0.00 - 0.03 ng/mL   Comprehensive Metabolic Panel w/ Reflex to MG   Result Value Ref Range    Sodium 139 132 - 146 mmol/L    Potassium reflex Magnesium 4.2 3.5 - 5.0 mmol/L    Chloride 101 98 - 107 mmol/L    CO2 26 22 - 29 mmol/L    Anion Gap 12 7 - 16 mmol/L    Glucose 109 (H) 74 - 99 mg/dL    BUN 13 6 - 20 mg/dL    CREATININE 1.1 0.7 - 1.2 mg/dL    GFR Non-African American >60 >=60 mL/min/1.73    GFR African American >60     Calcium 9.6 8.6 - 10.2 mg/dL    Total Protein 7.5 6.4 - 8.3 g/dL    Albumin 4.5 3.5 - 5.2 g/dL    Total Bilirubin 0.4 0.0 - 1.2 mg/dL    Alkaline Phosphatase 62 40 - 129 U/L    ALT 35 0 - 40 U/L    AST 31 0 - 39 U/L   EKG 12 Lead   Result Value Ref Range    Ventricular Rate 74 BPM    Atrial Rate 74 BPM    P-R Interval 126 ms    QRS Duration 102 ms    Q-T Interval 344 ms    QTc Calculation (Bazett) 381 ms    P Axis 29 degrees    R Axis 12 degrees    T Axis 12 degrees       RADIOLOGY:  XR CHEST PORTABLE   Final Result   No acute process. ------------------------- NURSING NOTES AND VITALS REVIEWED ---------------------------  Date / Time Roomed:  2/15/2021 10:20 AM  ED Bed Assignment:  10/10    The nursing notes within the ED encounter and vital signs as below have been reviewed. Patient Vitals for the past 24 hrs:   BP Temp Temp src Pulse Resp SpO2 Height Weight   02/15/21 1027 (!) 156/94 98.2 °F (36.8 °C) Infrared 77 20 99 % 5' 9\" (1.753 m) 222 lb (100.7 kg)           Counseling:  I have spoken with the patient/family members and discussed todays results, in addition to providing specific details for the plan of care and counseling regarding the diagnosis and prognosis. Their questions are answered at this time and they are agreeable with the plan of admission. This patient has remained hemodynamically stable during their ED course. Diagnosis:  1. Chest pain, unspecified type        Disposition:  Patient's disposition: Admit  Patient's condition is stable.     NOTE:  This report was transcribed using voice recognition software. Efforts were made to ensure accuracy; however, inadvertent computerized transcription errors may be present.          Natacha Lopez DO  Resident  02/15/21 9157

## 2021-02-15 NOTE — H&P
[address] [place], [place] [address]ist Group   History and Physical      CHIEF COMPLAINT:  Chest pain    History of Present Illness:  52 y.o. male with a history of hypertension, hyperlipidemia, GERD, Lea's esophagus, hiatal hernia presents to the Emergency Department with chest pain. He reports that the chest pain began last night around 7 p.m. and radiated to his back. He describes it as a heaviness and feels like something was sitting on his chest. He rates the pain at that time around a 7/10. He was having difficulty taking in deep breaths. He reports some pain in his left shoulder and left neck. He was uncomfortable throughout the night. The pain was still present this morning and rates it around 3/10. He had nausea and was clammy. He received Nitro in the Emergency Department that relieved his pain. He denies any history of smoking or illegal drug use. He drinks alcohol on occasion. He had a previous stress test about 20 years ago because he was having palpitations, but he reports that it was negative. EKG in the Emergency Department with no ST elevation. A repeat EKG was done as the patient reported feeling nauseated and clammy again. No ST elevation on repeat EKG. He does have cardiac risk factors including hypertension and hyperlipidemia. Informant(s) for H&P: Patient    REVIEW OF SYSTEMS:  no fevers, chills, cp, sob, n/v, ha, vision/hearing changes, wt changes, hot/cold flashes, other open skin lesions, diarrhea, constipation, dysuria/hematuria unless noted in HPI. Complete ROS performed with the patient and is otherwise negative.       PMH:  Past Medical History:   Diagnosis Date    Acid reflux     Lea esophagus     Hiatal hernia     Hyperlipidemia     Hypertension        Surgical History:  Past Surgical History:   Procedure Laterality Date    COLONOSCOPY  10/2017   Al Joshi SINUS SURGERY      2006    UPPER GASTROINTESTINAL ENDOSCOPY  11/2019       Medications Prior to cyanosis, no clubbing and no edema  Neurologic: no cranial nerve deficit and speech normal    LABS:  Recent Labs     02/15/21  1037      K 4.2      CO2 26   BUN 13   CREATININE 1.1   GLUCOSE 109*   CALCIUM 9.6       Recent Labs     02/15/21  1037   WBC 7.5   RBC 4.93   HGB 15.4   HCT 43.7   MCV 88.6   MCH 31.2   MCHC 35.2*   RDW 12.5      MPV 9.5       No results for input(s): POCGLU in the last 72 hours. Radiology: Xr Chest Portable    Result Date: 2/15/2021  EXAMINATION: ONE XRAY VIEW OF THE CHEST 2/15/2021 10:10 am COMPARISON: None. HISTORY: ORDERING SYSTEM PROVIDED HISTORY: chest pain TECHNOLOGIST PROVIDED HISTORY: Reason for exam:->chest pain FINDINGS: The lungs are without acute focal process. There is no effusion or pneumothorax. The cardiomediastinal silhouette is without acute process. The osseous structures are without acute process. No acute process. EKG:  SR    ASSESSMENT:      Principal Problem:    Chest pain  Active Problems:    Hypertension    Hyperlipidemia    Hiatal hernia    Lea esophagus    Acid reflux  Resolved Problems:    * No resolved hospital problems. *      PLAN:    1. Chest pain: EKG with no ST elevation. Troponin level <0.01. Cycle troponin levels. Consult Cardiology. Check TSH, free T4. Echo, stress test.  2. Hypertension: Continue Inderal.   3. Hyperlipidemia: Continue Pravachol. Lipid panel in am.   4. GERD, hx of Lea's esophagus: Protonix substituted for his home Dexilant. 5. Elevated blood sugars:  Check Hgb A1C. Code Status: Full  DVT prophylaxis: Lovenox    NOTE: This report was transcribed using voice recognition software.  Every effort was made to ensure accuracy; however, inadvertent computerized transcription errors may be present.     Electronically signed by UMAIR Fitch CNP on 2/15/2021 at 1:43 PM

## 2021-02-16 ENCOUNTER — APPOINTMENT (OUTPATIENT)
Dept: NON INVASIVE DIAGNOSTICS | Age: 51
End: 2021-02-16
Payer: COMMERCIAL

## 2021-02-16 ENCOUNTER — APPOINTMENT (OUTPATIENT)
Dept: NUCLEAR MEDICINE | Age: 51
End: 2021-02-16
Payer: COMMERCIAL

## 2021-02-16 VITALS
TEMPERATURE: 98.6 F | HEIGHT: 69 IN | WEIGHT: 220 LBS | RESPIRATION RATE: 16 BRPM | HEART RATE: 90 BPM | SYSTOLIC BLOOD PRESSURE: 128 MMHG | BODY MASS INDEX: 32.58 KG/M2 | DIASTOLIC BLOOD PRESSURE: 78 MMHG | OXYGEN SATURATION: 97 %

## 2021-02-16 LAB
ANION GAP SERPL CALCULATED.3IONS-SCNC: 11 MMOL/L (ref 7–16)
BUN BLDV-MCNC: 14 MG/DL (ref 6–20)
CALCIUM SERPL-MCNC: 9 MG/DL (ref 8.6–10.2)
CHLORIDE BLD-SCNC: 100 MMOL/L (ref 98–107)
CHOLESTEROL, TOTAL: 180 MG/DL (ref 0–199)
CO2: 27 MMOL/L (ref 22–29)
CREAT SERPL-MCNC: 1.1 MG/DL (ref 0.7–1.2)
EKG ATRIAL RATE: 74 BPM
EKG P AXIS: 23 DEGREES
EKG P-R INTERVAL: 118 MS
EKG Q-T INTERVAL: 356 MS
EKG QRS DURATION: 98 MS
EKG QTC CALCULATION (BAZETT): 395 MS
EKG R AXIS: 8 DEGREES
EKG T AXIS: 9 DEGREES
EKG VENTRICULAR RATE: 74 BPM
GFR AFRICAN AMERICAN: >60
GFR NON-AFRICAN AMERICAN: >60 ML/MIN/1.73
GLUCOSE BLD-MCNC: 104 MG/DL (ref 74–99)
HCT VFR BLD CALC: 41.1 % (ref 37–54)
HDLC SERPL-MCNC: 40 MG/DL
HEMOGLOBIN: 14.3 G/DL (ref 12.5–16.5)
LDL CHOLESTEROL CALCULATED: 95 MG/DL (ref 0–99)
LV EF: 63 %
LV EF: 68 %
LVEF MODALITY: NORMAL
LVEF MODALITY: NORMAL
MAGNESIUM: 1.9 MG/DL (ref 1.6–2.6)
MCH RBC QN AUTO: 31.6 PG (ref 26–35)
MCHC RBC AUTO-ENTMCNC: 34.8 % (ref 32–34.5)
MCV RBC AUTO: 90.7 FL (ref 80–99.9)
PDW BLD-RTO: 13.1 FL (ref 11.5–15)
PLATELET # BLD: 263 E9/L (ref 130–450)
PMV BLD AUTO: 9.7 FL (ref 7–12)
POTASSIUM SERPL-SCNC: 3.8 MMOL/L (ref 3.5–5)
RBC # BLD: 4.53 E12/L (ref 3.8–5.8)
SODIUM BLD-SCNC: 138 MMOL/L (ref 132–146)
TRIGL SERPL-MCNC: 223 MG/DL (ref 0–149)
VLDLC SERPL CALC-MCNC: 45 MG/DL
WBC # BLD: 6.1 E9/L (ref 4.5–11.5)

## 2021-02-16 PROCEDURE — C8929 TTE W OR WO FOL WCON,DOPPLER: HCPCS

## 2021-02-16 PROCEDURE — 93005 ELECTROCARDIOGRAM TRACING: CPT | Performed by: NURSE PRACTITIONER

## 2021-02-16 PROCEDURE — 80048 BASIC METABOLIC PNL TOTAL CA: CPT

## 2021-02-16 PROCEDURE — 6360000004 HC RX CONTRAST MEDICATION: Performed by: NURSE PRACTITIONER

## 2021-02-16 PROCEDURE — G0378 HOSPITAL OBSERVATION PER HR: HCPCS

## 2021-02-16 PROCEDURE — 90686 IIV4 VACC NO PRSV 0.5 ML IM: CPT | Performed by: INTERNAL MEDICINE

## 2021-02-16 PROCEDURE — 6370000000 HC RX 637 (ALT 250 FOR IP): Performed by: NURSE PRACTITIONER

## 2021-02-16 PROCEDURE — 78452 HT MUSCLE IMAGE SPECT MULT: CPT

## 2021-02-16 PROCEDURE — 3430000000 HC RX DIAGNOSTIC RADIOPHARMACEUTICAL: Performed by: RADIOLOGY

## 2021-02-16 PROCEDURE — 85027 COMPLETE CBC AUTOMATED: CPT

## 2021-02-16 PROCEDURE — G0008 ADMIN INFLUENZA VIRUS VAC: HCPCS | Performed by: INTERNAL MEDICINE

## 2021-02-16 PROCEDURE — 6360000002 HC RX W HCPCS: Performed by: INTERNAL MEDICINE

## 2021-02-16 PROCEDURE — 96372 THER/PROPH/DIAG INJ SC/IM: CPT

## 2021-02-16 PROCEDURE — 99217 PR OBSERVATION CARE DISCHARGE MANAGEMENT: CPT | Performed by: INTERNAL MEDICINE

## 2021-02-16 PROCEDURE — 78452 HT MUSCLE IMAGE SPECT MULT: CPT | Performed by: INTERNAL MEDICINE

## 2021-02-16 PROCEDURE — 36415 COLL VENOUS BLD VENIPUNCTURE: CPT

## 2021-02-16 PROCEDURE — 83735 ASSAY OF MAGNESIUM: CPT

## 2021-02-16 PROCEDURE — 93017 CV STRESS TEST TRACING ONLY: CPT

## 2021-02-16 PROCEDURE — 6360000002 HC RX W HCPCS: Performed by: NURSE PRACTITIONER

## 2021-02-16 PROCEDURE — A9500 TC99M SESTAMIBI: HCPCS | Performed by: RADIOLOGY

## 2021-02-16 PROCEDURE — 80061 LIPID PANEL: CPT

## 2021-02-16 PROCEDURE — APPSS45 APP SPLIT SHARED TIME 31-45 MINUTES: Performed by: NURSE PRACTITIONER

## 2021-02-16 RX ORDER — ASPIRIN 81 MG/1
81 TABLET, CHEWABLE ORAL DAILY
Qty: 30 TABLET | Refills: 0 | Status: ON HOLD | COMMUNITY
Start: 2021-02-17 | End: 2021-03-03

## 2021-02-16 RX ADMIN — ASPIRIN 81 MG: 81 TABLET, CHEWABLE ORAL at 11:12

## 2021-02-16 RX ADMIN — PANTOPRAZOLE SODIUM 40 MG: 40 TABLET, DELAYED RELEASE ORAL at 11:13

## 2021-02-16 RX ADMIN — PROPRANOLOL HYDROCHLORIDE 30 MG: 10 TABLET ORAL at 11:12

## 2021-02-16 RX ADMIN — Medication 30 MILLICURIE: at 10:12

## 2021-02-16 RX ADMIN — PRAVASTATIN SODIUM 20 MG: 20 TABLET ORAL at 11:12

## 2021-02-16 RX ADMIN — ENOXAPARIN SODIUM 40 MG: 40 INJECTION SUBCUTANEOUS at 11:12

## 2021-02-16 RX ADMIN — THIAMINE HCL TAB 100 MG 100 MG: 100 TAB at 11:12

## 2021-02-16 RX ADMIN — ACETAMINOPHEN 650 MG: 325 TABLET, FILM COATED ORAL at 14:27

## 2021-02-16 RX ADMIN — Medication 10 MILLICURIE: at 07:13

## 2021-02-16 RX ADMIN — INFLUENZA A VIRUS A/VICTORIA/2454/2019 IVR-207 (H1N1) ANTIGEN (PROPIOLACTONE INACTIVATED), INFLUENZA A VIRUS A/HONG KONG/2671/2019 IVR-208 (H3N2) ANTIGEN (PROPIOLACTONE INACTIVATED), INFLUENZA B VIRUS B/VICTORIA/705/2018 BVR-11 ANTIGEN (PROPIOLACTONE INACTIVATED), INFLUENZA B VIRUS B/PHUKET/3073/2013 BVR-1B ANTIGEN (PROPIOLACTONE INACTIVATED) 0.5 ML: 15; 15; 15; 15 INJECTION, SUSPENSION INTRAMUSCULAR at 17:37

## 2021-02-16 RX ADMIN — MULTIPLE VITAMINS W/ MINERALS TAB 1 TABLET: TAB at 11:12

## 2021-02-16 RX ADMIN — PERFLUTREN 1.65 MG: 6.52 INJECTION, SUSPENSION INTRAVENOUS at 07:55

## 2021-02-16 ASSESSMENT — PAIN DESCRIPTION - DESCRIPTORS: DESCRIPTORS: ACHING;DISCOMFORT;HEADACHE

## 2021-02-16 ASSESSMENT — PAIN DESCRIPTION - ORIENTATION: ORIENTATION: MID

## 2021-02-16 ASSESSMENT — PAIN DESCRIPTION - LOCATION: LOCATION: HEAD

## 2021-02-16 NOTE — PROGRESS NOTES
Oral, Daily PRN, Sarah SternUMAIR kraus CNP    aspirin chewable tablet 81 mg, 81 mg, Oral, Daily, Sarah SternUMAIR kraus CNP    enoxaparin (LOVENOX) injection 40 mg, 40 mg, Subcutaneous, Daily, Sarah Redd APRCECILIO - CNP, 40 mg at 02/15/21 1445    propranolol (INDERAL) tablet 30 mg, 30 mg, Oral, Daily, Sarah SternUMAIR kraus CNP    Physical Exam:  /85   Pulse 71   Temp 98.1 °F (36.7 °C) (Oral)   Resp 18   Ht 5' 9\" (1.753 m)   Wt 220 lb (99.8 kg)   SpO2 98%   BMI 32.49 kg/m²   Wt Readings from Last 3 Encounters:   02/15/21 220 lb (99.8 kg)   03/10/20 222 lb (100.7 kg)   12/02/19 215 lb (97.5 kg)     Appearance: Awake, alert, no acute respiratory distress  Skin: Intact, no rash  Head: Normocephalic, atraumatic  Eyes: EOMI, no conjunctival erythema  ENMT: No pharyngeal erythema, MMM, no rhinorrhea  Neck: Supple, no elevated JVP, no carotid bruits  Lungs: Clear to auscultation bilaterally. No wheezes, rales, or rhonchi. Cardiac: PMI nondisplaced, Regular rhythm with a tachycardic rate, S1 & S2 normal, no murmurs  Abdomen: Soft, nontender, +bowel sounds  Extremities: Moves all extremities x 4, no lower extremity edema  Neurologic: No focal motor deficits apparent, normal mood and affect  Peripheral Pulses: Intact posterior tibial pulses bilaterally    Intake/Output:    Intake/Output Summary (Last 24 hours) at 2/16/2021 1027  Last data filed at 2/16/2021 0523  Gross per 24 hour   Intake 240 ml   Output --   Net 240 ml     No intake/output data recorded.     Laboratory Tests:  Recent Labs     02/15/21  1037 02/16/21  0504    138   K 4.2 3.8    100   CO2 26 27   BUN 13 14   CREATININE 1.1 1.1   GLUCOSE 109* 104*   CALCIUM 9.6 9.0     Lab Results   Component Value Date    MG 1.9 02/16/2021     Recent Labs     02/15/21  1037   ALKPHOS 62   ALT 35   AST 31   PROT 7.5   BILITOT 0.4   LABALBU 4.5     Recent Labs     02/15/21  1037 02/16/21  0504   WBC 7.5 6.1   RBC 4.93 4.53   HGB 15.4 14.3   HCT 43.7 41.1   MCV 88.6 90.7   MCH 31.2 31.6   MCHC 35.2* 34.8*   RDW 12.5 13.1    263   MPV 9.5 9.7     Lab Results   Component Value Date    TROPONINI <0.01 02/15/2021    TROPONINI <0.01 02/15/2021    TROPONINI <0.01 02/15/2021     No results found for: INR, PROTIME  Lab Results   Component Value Date    TSH 1.770 02/15/2021     Lab Results   Component Value Date    LABA1C 5.5 02/15/2021     No results found for: EAG  Lab Results   Component Value Date    CHOL 180 02/16/2021    CHOL 221 02/26/2020    CHOL 188 07/24/2019     Lab Results   Component Value Date    TRIG 223 (H) 02/16/2021    TRIG 252 02/26/2020    TRIG 204 07/24/2019     Lab Results   Component Value Date    HDL 40 02/16/2021    HDL 43 02/26/2020    HDL 46 07/24/2019     Lab Results   Component Value Date    LDLCALC 95 02/16/2021    LDLCALC 128 02/26/2020    LDLCALC 101 07/24/2019     Lab Results   Component Value Date    LABVLDL 45 02/16/2021    LABVLDL 24 03/17/2017    LABVLDL 36 04/06/2016    VLDL 50 02/26/2020     Lab Results   Component Value Date    CHOLHDLRATIO 5.1 02/26/2020    CHOLHDLRATIO 4.1 07/24/2019     No results for input(s): PROBNP in the last 72 hours. Cardiac Tests:    EKG: All personally reviewed  2/15/2021 at 1239: Sinus rhythm 70 beats minute. Normal axis normal intervals. No ST-T wave changes.     2/15/2021 at 1025: Sinus rhythm 74 bpm.  Normal axis normal intervals. No ST-T wave changes.     Telemetry: Sinus tach 100s-110s     Chest X-ray:   2/15/2021  FINDINGS:   The lungs are without acute focal process.  There is no effusion or   pneumothorax. The cardiomediastinal silhouette is without acute process.  The   osseous structures are without acute process.       Impression   No acute process.      Echocardiogram: Pending     Stress test: Remote     Cardiac catheterization: None      ----------------------------------------------------------------------------------------------------------------------------------------------------------------  IMPRESSION:  1. Atypical chest pain. EKG unremarkable. No further chest pain. Troponins, D dimer negative  2. Hypertension, better controlled   3. Hyperlipidemia  4. GERD/Lea's esophagus/hiatal hernia  5. Obesity BMI 32.5 kg/m²    RECOMMENDATIONS:     Stress test today   Echo done, will review   If no significant ischemia, ok to DC from cardiology standpoint   Aggressive risk factor modification    Orestes Lara MD, [address] [facility] Cardiology    NOTE: This report was transcribed using voice recognition software. Every effort was made to ensure accuracy; however, inadvertent computerized transcription errors may be present.

## 2021-02-16 NOTE — CARE COORDINATION
2/16/21 1409 CM note: NO COVID TESTING. Met with patient at the bedside to discuss transition of care at discharge. Patient resides with his wife and son in a 2 floor with basement home. Patient is independent with ADLs, drives, and has no DME. Pts PCP is Dr Mattie Graham and his pharmacy is [facility]. Patient has no hx HHC or RICHARD. Discharge plan is home and patient does not anticipate any needs. Pts car is in the parking lot and he plans to drive himself home.  Electronically signed by Socorro Larson RN on 2/16/2021 at 2:12 PM

## 2021-02-16 NOTE — PLAN OF CARE
Problem: Falls - Risk of:  Goal: Will remain free from falls  Description: Will remain free from falls  2/16/2021 1122 by Liang Gomez RN  Outcome: Met This Shift     Problem: Falls - Risk of:  Goal: Absence of physical injury  Description: Absence of physical injury  Outcome: Met This Shift

## 2021-02-16 NOTE — PLAN OF CARE
Problem: Falls - Risk of:  Goal: Will remain free from falls  Description: Will remain free from falls  2/16/2021 0406 by Simba Benavidez RN  Outcome: Met This Shift  2/15/2021 1656 by Pio Elena RN  Outcome: Met This Shift

## 2021-02-16 NOTE — PROCEDURES
Exercise Nuclear Stress Test    Date: 2/16/2021    Indication: Chest pain    Description of procedure:    Protocol: Exercise stress SPECT myocardial perfusion imaging, Luis protocol    Baseline EKG:  bpm.  Normal axis/intervals. No ST/T changes. Baseline BP: 132/94 mmHg    Patient exercised for a total of 9 minutes, achieving a peak heart rate of 164 bpm which is 95% of the predicted maximum. Peak BP was 160/92 mmHg. Total workload achieved was 10.1 METs. Patient reported no chest pain. Stress EKG showed no evidence of ischemia, and no arrhythmias were noted. Radiotracer was injected at peak exercise. Impression:  1. No evidence of ischemia on exercise stress EKG. 2. Normal blood pressure response to exercise. 3. Normal heart rate recovery. 4. Average functional capacity. 5. Nuclear images to be reported separately.     Karel Licona MD, [facility] [facility] Cardiology

## 2021-02-16 NOTE — DISCHARGE SUMMARY
[facility] Physician Discharge Summary       [place] [id number]  [phone]            Activity level: Activity as tolerated    Diet: DIET CARDIAC;    Labs: Per PCP    Condition at discharge: Stable     Dispo:Discharge to home        Patient ID:  Jovita Jackson  [id number]  70 y.o.  1970    Admit date: 2/15/2021    Discharge date and time:  2/16/2021  3:54 PM    Admission Diagnoses: Principal Problem:    Chest pain  Active Problems:    Hypertension    Hyperlipidemia    Hiatal hernia    Lea esophagus    Acid reflux    Class 1 obesity due to excess calories without serious comorbidity with body mass index (BMI) of 32.0 to 32.9 in adult  Resolved Problems:    * No resolved hospital problems. *      Discharge Diagnoses: Principal Problem:    Chest pain  Active Problems:    Hypertension    Hyperlipidemia    Hiatal hernia    Lea esophagus    Acid reflux    Class 1 obesity due to excess calories without serious comorbidity with body mass index (BMI) of 32.0 to 32.9 in adult  Resolved Problems:    * No resolved hospital problems. *      Consults:  IP CONSULT TO CARDIOLOGY    Procedures: Stress test, Echo    Hospital Course: Taya Rocha is a 52year old male that presented to the Emergency Department with chest pain. EKG was done with no ST elevation. Troponin levels were checked and were negative. TSH was 1.770, Free T4 1.39. Chest xray was negative. Cardiology was consulted. An Echo was done with an EF of 60-65%, moderate LVH, mild aortic valve regurgitation. A stress test was done showing myocardial perfusion was normal. The patient had no further chest pain. He was advised that if he would to have recurrent chest pain that he should return to the Emergency Department. He is stable for discharge home and was advised to follow up with his PCP.      Discharge Exam:  Vitals:    02/15/21 1253 02/15/21 1915 02/16/21 1100 02/16/21 1537   BP: (!) 137/92 129/85 128/78    Pulse: 81 71 102 90   Resp: 18 18 16 16   Temp: 98.9 °F (37.2 °C) 98.1 °F (36.7 °C) 98.2 °F (36.8 °C) 98.6 °F (37 °C)   TempSrc: Oral Oral  Oral   SpO2: 99% 98% 92% 97%   Weight: 220 lb (99.8 kg)      Height: 5' 9\" (1.753 m)          General Appearance: alert and oriented to person, place and time and in no acute distress  Skin: warm and dry, no rash or erythema  Head: normocephalic and atraumatic  Eyes: pupils equal, round, and reactive to light and conjunctivae normal  Neck: neck supple and non tender without mass   Pulmonary/Chest: clear to auscultation bilaterally- no wheezes, rales or rhonchi, normal air movement, no respiratory distress  Cardiovascular: normal rate, normal S1 and S2, no gallops and intact distal pulses  Abdomen: soft, non-tender, non-distended and bowel sounds normal  Extremities: no cyanosis and no clubbing  Musculoskeletal: no swollen joints and no joint instability  Neurologic: gait and coordination normal and speech normal  I/O last 3 completed shifts: In: 360 [P.O.:360]  Out: -   No intake/output data recorded. LABS:  Recent Labs     02/15/21  1037 02/16/21  0504    138   K 4.2 3.8    100   CO2 26 27   BUN 13 14   CREATININE 1.1 1.1   GLUCOSE 109* 104*   CALCIUM 9.6 9.0       Recent Labs     02/15/21  1037 02/16/21  0504   WBC 7.5 6.1   RBC 4.93 4.53   HGB 15.4 14.3   HCT 43.7 41.1   MCV 88.6 90.7   MCH 31.2 31.6   MCHC 35.2* 34.8*   RDW 12.5 13.1    263   MPV 9.5 9.7       No results for input(s): POCGLU in the last 72 hours.         Imaging:  Echo Complete    Result Date: 2/16/2021  Transthoracic Echocardiography Report (TTE)  Demographics   Patient Name      Enid Castellanos  Gender              Male                    C   Medical Record    [id number]       Room Number         2662  Number   Account #         [de-identified]      Procedure Date      02/16/2021   Corporate ID                     Ordering Physician  Frida Jones Kellie Jaime MD   Accession Number  [id number]     Referring Physician   Date of Birth     1970     [address] [place] [address]   Age               52 year(s)     Interpreting        Roland Arteaga MD                                   Physician                                    Any Other  Procedure Type of Study   TTE procedure  Procedure Date Date: 02/16/2021 Start: 07:27 AM Study Location: Echo Lab Technical Quality: Adequate visualization Indications:Chest pain. Patient Status: Routine Contrast Medium: Definity. Height: 69 inches Weight: 220 pounds BSA: 2.15 m^2 BMI: 32.49 kg/m^2 Rhythm: Within normal limits HR: 68 bpm BP: 129/85 mmHg  Findings   Left Ventricle  Micro-bubble contrast injected to enhance left ventricular visualization. Normal left ventricular size and systolic function. Ejection fraction is visually estimated at 60-65%. Normal diastolic function. No regional wall motion abnormalities seen. Moderate left ventricular concentric hypertrophy noted. Right Ventricle  Normal right ventricular size and function. Left Atrium  Normal sized left atrium. The interatrial septum appears intact. Right Atrium  Normal right atrial size. Mitral Valve  Structurally normal mitral valve. No evidence of mitral valve stenosis. Physiologic mitral regurgitation. Tricuspid Valve  The tricuspid valve appears structurally normal.  Physiologic and/or trace tricuspid regurgitation. RVSP is 28 mmHg. Aortic Valve  The aortic valve appears mildly sclerotic. The aortic valve is probably trileaflet. No hemodynamically significant aortic stenosis is present. Mild aortic valve regurgitation.  ms. Pulmonic Valve  The pulmonic valve was not well visualized. No evidence of pulmonic valve stenosis. No evidence of any pulmonic regurgitation. Pericardial Effusion  No evidence of pericardial effusion.    Aorta  Aortic root dimension within normal limits. Miscellaneous  Normal Inferior Vena Cava diameter and respiratory variation. Conclusions   Summary  Micro-bubble contrast injected to enhance left ventricular visualization. Normal left ventricular size and systolic function. Ejection fraction is visually estimated at 60-65%. Normal diastolic function. No regional wall motion abnormalities seen. Moderate left ventricular concentric hypertrophy noted. Normal right ventricular size and function. Mild aortic valve regurgitation.    Signature   ----------------------------------------------------------------  Electronically signed by Emile Flores MD(Interpreting  physician) on 02/16/2021 11:39 AM  ----------------------------------------------------------------  M-Mode/2D Measurements & Calculations   LV Diastolic    LV Systolic Dimension: 2.4   AV Cusp Separation: 2.2 cmLA  Dimension: 4.2  cm                           Dimension: 3.3 cmAO Root  cm              LV Volume Diastolic: 32.2 ml Dimension: 3.7 cm  LV FS:42.9 %    LV Volume Systolic: 68.9 ml  LV PW           LV EDV/LV EDV Index: 56.2  Diastolic: 1.4  NH/00 SM/M^8FK ESV/LV ESV  cm              Index: 20.7 ml/10ml/ m^2     RV Diastolic Dimension: 3.3  Septum          EF Calculated: 74.4 %        cm  Diastolic: 1.5  LV Mass Index: 110 l/min*m^2  cm  CO: 3.74 l/min                               LA volume/Index: 54.7 ml  CI: 1.74        LVOT: 2 cm  l/m*m^2  LV Mass: 236.74  g  Doppler Measurements & Calculations   MV Peak E-Wave:     AV Peak Velocity: 1.25 LVOT Peak Velocity: 0.99 m/s  0.54 m/s            m/s                    LVOT Mean Velocity: 0.6 m/s  MV Peak A-Wave: 0.5 AV Peak Gradient: 6.25 LVOT Peak Gradient: 3.9  m/s                 mmHg                   mmHgLVOT Mean Gradient: 1.8  MV E/A Ratio: 1.08  AV Mean Velocity: 0.95 mmHg  MV Peak Gradient:   m/s  2.4 mmHg            AV Mean Gradient: 3.9  MV Mean Gradient:   mmHg  1.2 mmHg            AV VTI: 23.7 cm        TR Velocity:2.5 m/s  MV Mean Velocity:   AV Area                TR Gradient:25.04 mmHg  0.53 m/s            (Continuity):2.32 cm^2 PV Peak Velocity: 1.07 m/s  MV Deceleration     AV Deceleration Time:  PV Peak Gradient: 4.56 mmHg  Time: 226.3 msec    2483.3 msec            PV Mean Velocity: 0.81 m/s  MV P1/2t: 52 msec   LVOT VTI: 17.5 cm      PV Mean Gradient: 2.9 mmHg  MVA by PHT:4.23     IVRT: 128 msec  cm^2  MV Area  (continuity): 3.2  cm^2  [email or web address] [id number]    Xr Chest Portable    Result Date: 2/15/2021  EXAMINATION: ONE XRAY VIEW OF THE CHEST 2/15/2021 10:10 am COMPARISON: None. HISTORY: ORDERING SYSTEM PROVIDED HISTORY: chest pain TECHNOLOGIST PROVIDED HISTORY: Reason for exam:->chest pain FINDINGS: The lungs are without acute focal process. There is no effusion or pneumothorax. The cardiomediastinal silhouette is without acute process. The osseous structures are without acute process. No acute process. Nm Cardiac Stress Test Nuclear Imaging    Result Date: 2/16/2021  Indication:  Chest Pain Clinical History:   Patient has no previous historyof coronary artery disease. IMAGING: Myocardial perfusion imaging was performed at rest 30-35 minutes following the intravenous injection of 8.4 mCi of (Tc-Sestamibi) followed by 10 ml of Normal Saline. At peak exercise, the patient was injected intravenously with 28. 7mCi of (Tc-Sestamibi) followed by 10 ml of Normal Saline. Gated post-stress tomographic imaging was performed 20-25 minutes after stress. FINDINGS: The overall quality of the study was good. Left ventricular cavity size was noted to be normal. Rotational analog analysis demonstrated abnormal patient motion A mild to moderately severedefect was present in the mid anterior and lateral wall(s) that was moderatesized by quantification.          There also was a mild defect present in the inferolateral  wall(s) that was small  sized by quantification. The resting images no significant change Gated SPECT left ventricular ejection fraction was calculated to be 68%, with normal myocardial contractility and wall motion. The myocardial perfusion imaging was normal with attenuation and motion artifacts Overall left ventricular systolic function was normal Duke treadmill score was +9 implying low risk. Exercise capacity was average Low risk nuclear exercise treadmill test.        Patient Instructions:   Current Discharge Medication List      START taking these medications    Details   aspirin 81 MG chewable tablet Take 1 tablet by mouth daily  Qty: 30 tablet, Refills: 0         CONTINUE these medications which have NOT CHANGED    Details   propranolol (INDERAL) 10 MG tablet Take 1 tablet by mouth 3 times daily  Qty: 90 tablet, Refills: 3      pravastatin (PRAVACHOL) 20 MG tablet Take 1 tablet by mouth daily  Qty: 90 tablet, Refills: 1      Multiple Vitamins-Minerals (THERAPEUTIC MULTIVITAMIN-MINERALS) tablet Take 1 tablet by mouth daily      thiamine mononitrate (GNP VITAMIN B-1) 100 MG tablet Take 100 mg by mouth daily      Omega-3 Fatty Acids (FISH OIL) 500 MG CAPS Take 500 mg by mouth daily      dexlansoprazole (DEXILANT) 60 MG CPDR capsule Take 60 mg by mouth daily. Note that more than 30 minutes was spent in preparing discharge papers, discussing discharge with patient, medication review, etc.    NOTE: This report was transcribed using voice recognition software. Every effort was made to ensure accuracy; however, inadvertent computerized transcription errors may be present.      Signed:  Electronically signed by UMAIR Gar CNP on 2/16/2021 at 3:54 PM

## 2021-02-16 NOTE — PROGRESS NOTES
P Quality Flow/Interdisciplinary Rounds Progress Note        Quality Flow Rounds held on February 16, 2021    Disciplines Attending:  Bedside Nurse, ,  and Nursing Unit [facility] [facility] Miranda was admitted on 2/15/2021 10:20 AM    Anticipated Discharge Date:  Expected Discharge Date: 02/18/21    Disposition:    Dimas Score:  Dimas Scale Score: 23    Readmission Risk              Risk of Unplanned Readmission:        0           Discussed patient goal for the day, patient clinical progression, and barriers to discharge.   The following Goal(s) of the Day/Commitment(s) have been identified:  Stress      Ron Plate  February 16, 2021

## 2021-02-18 ENCOUNTER — TELEPHONE (OUTPATIENT)
Dept: CARDIOLOGY CLINIC | Age: 51
End: 2021-02-18

## 2021-02-19 ENCOUNTER — TELEPHONE (OUTPATIENT)
Dept: FAMILY MEDICINE CLINIC | Age: 51
End: 2021-02-19

## 2021-02-19 NOTE — TELEPHONE ENCOUNTER
[facility] Transitions Initial Follow Up Call    Outreach made within 2 business days of discharge: Yes    Patient: Aj Briggs Patient : 1970   MRN: <[id number]>  Reason for Admission: CHEST PAIN     Discharge Date: 21       Spoke with:  LEFT MESSAGE      Discharge department/facility: HOME       Scheduled appointment with PCP within 7-14 days    Follow Up  Future Appointments   Date Time Provider Sangita Corey   2021 10:15 AM MD Emil Tabares

## 2021-02-24 ENCOUNTER — OFFICE VISIT (OUTPATIENT)
Dept: FAMILY MEDICINE CLINIC | Age: 51
End: 2021-02-24
Payer: COMMERCIAL

## 2021-02-24 VITALS
WEIGHT: 223 LBS | TEMPERATURE: 98 F | DIASTOLIC BLOOD PRESSURE: 70 MMHG | SYSTOLIC BLOOD PRESSURE: 110 MMHG | OXYGEN SATURATION: 98 % | HEIGHT: 66 IN | HEART RATE: 74 BPM | BODY MASS INDEX: 35.84 KG/M2

## 2021-02-24 DIAGNOSIS — U07.1 COVID-19: ICD-10-CM

## 2021-02-24 DIAGNOSIS — R73.9 HYPERGLYCEMIA: ICD-10-CM

## 2021-02-24 DIAGNOSIS — R07.1 CHEST PAIN ON BREATHING: ICD-10-CM

## 2021-02-24 DIAGNOSIS — K22.70 BARRETT'S ESOPHAGUS WITHOUT DYSPLASIA: ICD-10-CM

## 2021-02-24 DIAGNOSIS — E78.5 HYPERLIPIDEMIA, UNSPECIFIED HYPERLIPIDEMIA TYPE: ICD-10-CM

## 2021-02-24 DIAGNOSIS — R09.1 PLEURISY: Primary | ICD-10-CM

## 2021-02-24 PROCEDURE — 1111F DSCHRG MED/CURRENT MED MERGE: CPT | Performed by: INTERNAL MEDICINE

## 2021-02-24 PROCEDURE — G8427 DOCREV CUR MEDS BY ELIG CLIN: HCPCS | Performed by: INTERNAL MEDICINE

## 2021-02-24 PROCEDURE — 1036F TOBACCO NON-USER: CPT | Performed by: INTERNAL MEDICINE

## 2021-02-24 PROCEDURE — G8482 FLU IMMUNIZE ORDER/ADMIN: HCPCS | Performed by: INTERNAL MEDICINE

## 2021-02-24 PROCEDURE — G8417 CALC BMI ABV UP PARAM F/U: HCPCS | Performed by: INTERNAL MEDICINE

## 2021-02-24 PROCEDURE — 99214 OFFICE O/P EST MOD 30 MIN: CPT | Performed by: INTERNAL MEDICINE

## 2021-02-24 ASSESSMENT — PATIENT HEALTH QUESTIONNAIRE - PHQ9
SUM OF ALL RESPONSES TO PHQ QUESTIONS 1-9: 0
SUM OF ALL RESPONSES TO PHQ9 QUESTIONS 1 & 2: 0
2. FEELING DOWN, DEPRESSED OR HOPELESS: 0
SUM OF ALL RESPONSES TO PHQ QUESTIONS 1-9: 0
SUM OF ALL RESPONSES TO PHQ QUESTIONS 1-9: 0

## 2021-02-24 NOTE — PROGRESS NOTES
Post-Discharge Transitional Care Management Services or Hospital Follow Up      Aditi Jernigan   YOB: 1970    Date of Office Visit:  2/24/2021  Date of Hospital Admission: 2/15/21  Date of Hospital Discharge: 2/16/21  Risk of hospital readmission (high >=14%. Medium >=10%) :No data recorded    Care management risk score Rising risk (score 2-5) and Complex Care (Scores >=6): 0     Non face to face  following discharge, date last encounter closed (first attempt may have been earlier): 2/19/2021 10:31 AM    Call initiated 2 business days of discharge: Yes    Patient Active Problem List   Diagnosis    Chest pain    Hypertension    Hyperlipidemia    Hiatal hernia    Lea esophagus    Acid reflux    Class 1 obesity due to excess calories without serious comorbidity with body mass index (BMI) of 32.0 to 32.9 in adult       Allergies   Allergen Reactions    Levaquin [Levofloxacin] Other (See Comments)     joint pain       Medications listed as ordered at the time of discharge from hospital   Paulo Butterfield   Home Medication Instructions ERIN:    Printed on:02/24/21 9704   Medication Information                      aspirin 81 MG chewable tablet  Take 1 tablet by mouth daily             dexlansoprazole (DEXILANT) 60 MG CPDR capsule  Take 60 mg by mouth daily.                Multiple Vitamins-Minerals (THERAPEUTIC MULTIVITAMIN-MINERALS) tablet  Take 1 tablet by mouth daily             Omega-3 Fatty Acids (FISH OIL) 500 MG CAPS  Take 500 mg by mouth daily             pravastatin (PRAVACHOL) 20 MG tablet  Take 1 tablet by mouth daily             propranolol (INDERAL) 10 MG tablet  Take 1 tablet by mouth 3 times daily             thiamine mononitrate (GNP VITAMIN B-1) 100 MG tablet  Take 100 mg by mouth daily                   Medications marked \"taking\" at this time  Outpatient Medications Marked as Taking for the 2/24/21 encounter (Office Visit) with Yuniel Maya MD   Medication Sig Dispense Refill    propranolol (INDERAL) 10 MG tablet Take 1 tablet by mouth 3 times daily (Patient taking differently: Take 30 mg by mouth daily ) 90 tablet 3    pravastatin (PRAVACHOL) 20 MG tablet Take 1 tablet by mouth daily 90 tablet 1    Multiple Vitamins-Minerals (THERAPEUTIC MULTIVITAMIN-MINERALS) tablet Take 1 tablet by mouth daily      thiamine mononitrate (GNP VITAMIN B-1) 100 MG tablet Take 100 mg by mouth daily      Omega-3 Fatty Acids (FISH OIL) 500 MG CAPS Take 500 mg by mouth daily      dexlansoprazole (DEXILANT) 60 MG CPDR capsule Take 60 mg by mouth daily. Medications patient taking as of now reconciled against medications ordered at time of hospital discharge: Yes    Chief Complaint   Patient presents with   [address] from Hospital     ER       History of Present illness - Follow up of Hospital diagnosis(es): This is a 52years old male he had COVID-19 on December 28, 2020 he was sick for few days his family was also sick but then they improved  On February 14,2021 at night he had chest pain it was substernal radiating to the back intrascapular area  Associated sweating and some shortness of breath  Patient has history of hypertension, hyperlipidemia, GE reflux, Lea's esophagus, he was still having chest pain he was given nitroglycerin with improvement of symptoms   52 y.o. male with a history of hypertension, hyperlipidemia, GERD, Lea's esophagus, hiatal hernia presents to the Emergency Department with chest pain. He reports that the chest pain began last night around 7 p.m. and radiated to his back. He describes it as a heaviness and feels like something was sitting on his chest. He rates the pain at that time around a 7/10. He was having difficulty taking in deep breaths. He reports some pain in his left shoulder and left neck. He was uncomfortable throughout the night. The pain was still present this morning and rates it around 3/10.   He had nausea and was clammy. He received Nitro in the Emergency Department that relieved his pain. He denies any history of smoking or illegal drug use. He drinks alcohol on occasion. He had a previous stress test about 20 years ago because he was having palpitations, but he reports that it was negative. EKG in the Emergency Department with no ST elevation. A repeat EKG was done as the patient reported feeling nauseated and clammy again. No ST elevation on repeat EKG. He does have cardiac risk factors including hypertension and hyperlipidemia.     Inpatient course: Discharge summary reviewed- see chart. Interval history/Current status:   Hospital Course: Khris Ochoa is a 52year old male that presented to the Emergency Department with chest pain. EKG was done with no ST elevation. Troponin levels were checked and were negative. TSH was 1.770, Free T4 1.39. Chest xray was negative. Cardiology was consulted. An Echo was done with an EF of 60-65%, moderate LVH, mild aortic valve regurgitation. A stress test was done showing myocardial perfusion was normal. The patient had no further chest pain. He was advised that if he would to have recurrent chest pain that he should return to the Emergency Department. He is stable for discharge home and was advised to follow up with his pcp    He is still having pain in the chest in the substernal area it radiates back down to the back it hurts to take a deep breath is D-dimer less than 200  Patient did see Dr. Leonard Bobo few days ago he is planning endoscopy in 2 weeks patient has history of GE reflux disease and he is taking Dexilant history of Lea's esophagus  Because of COVID-19 he was taking a lot of Advil or Aleve    Vitals:    02/24/21 1035   BP: 110/70   Pulse: 74   Temp: 98 °F (36.7 °C)   SpO2: 98%   Weight: 223 lb (101.2 kg)   Height: 5' 6\" (1.676 m)     Body mass index is 35.99 kg/m².    Wt Readings from Last 3 Encounters:   02/24/21 223 lb (101.2 kg)   02/15/21 220 lb (99.8 kg)   03/10/20 222 lb (100.7 kg)     BP Readings from Last 3 Encounters:   02/24/21 110/70   02/16/21 128/78   03/10/20 110/74        Physical Exam:  Patient appears comfortable  Still having substernal pain radiating to the back  Neck no JVD adenopathy no bruit  Chest no localized tenderness  Lungs no crackles but he had pleuritic symptom with deep breathing  Abdomen soft nontender  Extremity no edema  No calf tenderness, Homans test negative  Neuro was nonfocal    Assessment/Plan  Chest pain with deep breathing pleuritic in nature  He has shortness breath with exertion he was walking with his wife and short of breath he had COVID-19 there is a risk of hypercoagulability with COVID-19  CTA chest    Chest pain seen by cardiologist in hospital thought to be atypical chest pain had a stress test he walked 9 minutes on the treadmill stress test was read as negative      Hyperlipidemia continue pravastatin      He has Lea's esophagus continue Dexilant diet modification going for endoscopy in 2 weeks      Lifestyle diet modification discussed      Follow back in 4 weeks    Medical Decision Making: moderate complexity

## 2021-03-03 ENCOUNTER — HOSPITAL ENCOUNTER (OUTPATIENT)
Age: 51
Setting detail: OBSERVATION
Discharge: HOME | End: 2021-03-06
Attending: EMERGENCY MEDICINE | Admitting: INTERNAL MEDICINE
Payer: COMMERCIAL

## 2021-03-03 ENCOUNTER — APPOINTMENT (OUTPATIENT)
Dept: CT IMAGING | Age: 51
End: 2021-03-03
Payer: COMMERCIAL

## 2021-03-03 ENCOUNTER — HOSPITAL ENCOUNTER (EMERGENCY)
Age: 51
Discharge: HOME | End: 2021-03-03
Payer: COMMERCIAL

## 2021-03-03 VITALS
OXYGEN SATURATION: 99 % | WEIGHT: 220 LBS | RESPIRATION RATE: 18 BRPM | BODY MASS INDEX: 32.58 KG/M2 | DIASTOLIC BLOOD PRESSURE: 84 MMHG | HEIGHT: 69 IN | SYSTOLIC BLOOD PRESSURE: 124 MMHG | HEART RATE: 73 BPM | TEMPERATURE: 97.7 F

## 2021-03-03 DIAGNOSIS — I80.8 SUPERFICIAL THROMBOPHLEBITIS OF RIGHT UPPER EXTREMITY: Primary | ICD-10-CM

## 2021-03-03 DIAGNOSIS — I26.99 ACUTE PULMONARY EMBOLISM WITHOUT ACUTE COR PULMONALE, UNSPECIFIED PULMONARY EMBOLISM TYPE (HCC): Primary | ICD-10-CM

## 2021-03-03 LAB
ANION GAP SERPL CALCULATED.3IONS-SCNC: 9 MMOL/L (ref 7–16)
APTT: 28.8 SEC (ref 24.5–35.1)
APTT: 35.2 SEC (ref 24.5–35.1)
BASOPHILS ABSOLUTE: 0.07 E9/L (ref 0–0.2)
BASOPHILS ABSOLUTE: 0.1 E9/L (ref 0–0.2)
BASOPHILS RELATIVE PERCENT: 1 % (ref 0–2)
BASOPHILS RELATIVE PERCENT: 1.5 % (ref 0–2)
BUN BLDV-MCNC: 13 MG/DL (ref 6–20)
CALCIUM SERPL-MCNC: 9.7 MG/DL (ref 8.6–10.2)
CHLORIDE BLD-SCNC: 102 MMOL/L (ref 98–107)
CO2: 28 MMOL/L (ref 22–29)
CREAT SERPL-MCNC: 1.1 MG/DL (ref 0.7–1.2)
EOSINOPHILS ABSOLUTE: 0.09 E9/L (ref 0.05–0.5)
EOSINOPHILS ABSOLUTE: 0.09 E9/L (ref 0.05–0.5)
EOSINOPHILS RELATIVE PERCENT: 1.3 % (ref 0–6)
EOSINOPHILS RELATIVE PERCENT: 1.4 % (ref 0–6)
GFR AFRICAN AMERICAN: >60
GFR AFRICAN AMERICAN: >60
GFR NON-AFRICAN AMERICAN: >60 ML/MIN/1.73
GFR NON-AFRICAN AMERICAN: >60 ML/MIN/1.73
GLUCOSE BLD-MCNC: 103 MG/DL (ref 74–99)
GLUCOSE BLD-MCNC: 99 MG/DL (ref 74–99)
HCT VFR BLD CALC: 44.5 % (ref 37–54)
HCT VFR BLD CALC: 44.9 % (ref 37–54)
HEMOGLOBIN: 15.5 G/DL (ref 12.5–16.5)
HEMOGLOBIN: 15.8 G/DL (ref 12.5–16.5)
IMMATURE GRANULOCYTES #: 0.02 E9/L
IMMATURE GRANULOCYTES #: 0.02 E9/L
IMMATURE GRANULOCYTES %: 0.3 % (ref 0–5)
IMMATURE GRANULOCYTES %: 0.3 % (ref 0–5)
LYMPHOCYTES ABSOLUTE: 1.67 E9/L (ref 1.5–4)
LYMPHOCYTES ABSOLUTE: 1.77 E9/L (ref 1.5–4)
LYMPHOCYTES RELATIVE PERCENT: 24.6 % (ref 20–42)
LYMPHOCYTES RELATIVE PERCENT: 27.1 % (ref 20–42)
MCH RBC QN AUTO: 30.8 PG (ref 26–35)
MCH RBC QN AUTO: 30.9 PG (ref 26–35)
MCHC RBC AUTO-ENTMCNC: 34.8 % (ref 32–34.5)
MCHC RBC AUTO-ENTMCNC: 35.2 % (ref 32–34.5)
MCV RBC AUTO: 87.9 FL (ref 80–99.9)
MCV RBC AUTO: 88.5 FL (ref 80–99.9)
MONOCYTES ABSOLUTE: 0.82 E9/L (ref 0.1–0.95)
MONOCYTES ABSOLUTE: 0.83 E9/L (ref 0.1–0.95)
MONOCYTES RELATIVE PERCENT: 12.1 % (ref 2–12)
MONOCYTES RELATIVE PERCENT: 12.7 % (ref 2–12)
NEUTROPHILS ABSOLUTE: 3.72 E9/L (ref 1.8–7.3)
NEUTROPHILS ABSOLUTE: 4.12 E9/L (ref 1.8–7.3)
NEUTROPHILS RELATIVE PERCENT: 57 % (ref 43–80)
NEUTROPHILS RELATIVE PERCENT: 60.7 % (ref 43–80)
PDW BLD-RTO: 12.6 FL (ref 11.5–15)
PDW BLD-RTO: 12.6 FL (ref 11.5–15)
PERFORMED ON: ABNORMAL
PLATELET # BLD: 351 E9/L (ref 130–450)
PLATELET # BLD: 359 E9/L (ref 130–450)
PMV BLD AUTO: 9.2 FL (ref 7–12)
PMV BLD AUTO: 9.3 FL (ref 7–12)
POC CHLORIDE: 106 MMOL/L (ref 100–108)
POC CREATININE: 1.1 MG/DL (ref 0.7–1.2)
POC POTASSIUM: 4.4 MMOL/L (ref 3.5–5)
POC SODIUM: 143 MMOL/L (ref 132–146)
POTASSIUM REFLEX MAGNESIUM: 4.4 MMOL/L (ref 3.5–5)
PRO-BNP: 30 PG/ML (ref 0–125)
RBC # BLD: 5.03 E12/L (ref 3.8–5.8)
RBC # BLD: 5.11 E12/L (ref 3.8–5.8)
SODIUM BLD-SCNC: 139 MMOL/L (ref 132–146)
TROPONIN: <0.01 NG/ML (ref 0–0.03)
WBC # BLD: 6.5 E9/L (ref 4.5–11.5)
WBC # BLD: 6.8 E9/L (ref 4.5–11.5)

## 2021-03-03 PROCEDURE — G0378 HOSPITAL OBSERVATION PER HR: HCPCS

## 2021-03-03 PROCEDURE — 99211 OFF/OP EST MAY X REQ PHY/QHP: CPT

## 2021-03-03 PROCEDURE — 99285 EMERGENCY DEPT VISIT HI MDM: CPT

## 2021-03-03 PROCEDURE — 83880 ASSAY OF NATRIURETIC PEPTIDE: CPT

## 2021-03-03 PROCEDURE — 85730 THROMBOPLASTIN TIME PARTIAL: CPT

## 2021-03-03 PROCEDURE — 85025 COMPLETE CBC W/AUTO DIFF WBC: CPT

## 2021-03-03 PROCEDURE — 36415 COLL VENOUS BLD VENIPUNCTURE: CPT

## 2021-03-03 PROCEDURE — 82565 ASSAY OF CREATININE: CPT

## 2021-03-03 PROCEDURE — 6370000000 HC RX 637 (ALT 250 FOR IP): Performed by: PHYSICIAN ASSISTANT

## 2021-03-03 PROCEDURE — 71275 CT ANGIOGRAPHY CHEST: CPT

## 2021-03-03 PROCEDURE — 84132 ASSAY OF SERUM POTASSIUM: CPT

## 2021-03-03 PROCEDURE — 80048 BASIC METABOLIC PNL TOTAL CA: CPT

## 2021-03-03 PROCEDURE — 82435 ASSAY OF BLOOD CHLORIDE: CPT

## 2021-03-03 PROCEDURE — 84295 ASSAY OF SERUM SODIUM: CPT

## 2021-03-03 PROCEDURE — 84484 ASSAY OF TROPONIN QUANT: CPT

## 2021-03-03 PROCEDURE — 6360000004 HC RX CONTRAST MEDICATION: Performed by: RADIOLOGY

## 2021-03-03 PROCEDURE — 2580000003 HC RX 258: Performed by: INTERNAL MEDICINE

## 2021-03-03 PROCEDURE — 99220 PR INITIAL OBSERVATION CARE/DAY 70 MINUTES: CPT | Performed by: INTERNAL MEDICINE

## 2021-03-03 PROCEDURE — 82947 ASSAY GLUCOSE BLOOD QUANT: CPT

## 2021-03-03 RX ORDER — ONDANSETRON 2 MG/ML
4 INJECTION INTRAMUSCULAR; INTRAVENOUS EVERY 6 HOURS PRN
Status: DISCONTINUED | OUTPATIENT
Start: 2021-03-03 | End: 2021-03-06 | Stop reason: HOSPADM

## 2021-03-03 RX ORDER — SODIUM CHLORIDE 0.9 % (FLUSH) 0.9 %
10 SYRINGE (ML) INJECTION EVERY 12 HOURS SCHEDULED
Status: DISCONTINUED | OUTPATIENT
Start: 2021-03-03 | End: 2021-03-06 | Stop reason: HOSPADM

## 2021-03-03 RX ORDER — HEPARIN SODIUM 1000 [USP'U]/ML
80 INJECTION, SOLUTION INTRAVENOUS; SUBCUTANEOUS ONCE
Status: DISCONTINUED | OUTPATIENT
Start: 2021-03-03 | End: 2021-03-03

## 2021-03-03 RX ORDER — HEPARIN SODIUM 1000 [USP'U]/ML
40 INJECTION, SOLUTION INTRAVENOUS; SUBCUTANEOUS PRN
Status: DISCONTINUED | OUTPATIENT
Start: 2021-03-03 | End: 2021-03-03

## 2021-03-03 RX ORDER — HEPARIN SODIUM 1000 [USP'U]/ML
80 INJECTION, SOLUTION INTRAVENOUS; SUBCUTANEOUS PRN
Status: DISCONTINUED | OUTPATIENT
Start: 2021-03-03 | End: 2021-03-03

## 2021-03-03 RX ORDER — ACETAMINOPHEN 325 MG/1
650 TABLET ORAL EVERY 6 HOURS PRN
Status: DISCONTINUED | OUTPATIENT
Start: 2021-03-03 | End: 2021-03-06 | Stop reason: HOSPADM

## 2021-03-03 RX ORDER — POLYETHYLENE GLYCOL 3350 17 G/17G
17 POWDER, FOR SOLUTION ORAL DAILY PRN
Status: DISCONTINUED | OUTPATIENT
Start: 2021-03-03 | End: 2021-03-06 | Stop reason: HOSPADM

## 2021-03-03 RX ORDER — ACETAMINOPHEN 650 MG/1
650 SUPPOSITORY RECTAL EVERY 6 HOURS PRN
Status: DISCONTINUED | OUTPATIENT
Start: 2021-03-03 | End: 2021-03-06 | Stop reason: HOSPADM

## 2021-03-03 RX ORDER — SODIUM CHLORIDE 0.9 % (FLUSH) 0.9 %
10 SYRINGE (ML) INJECTION PRN
Status: DISCONTINUED | OUTPATIENT
Start: 2021-03-03 | End: 2021-03-06 | Stop reason: HOSPADM

## 2021-03-03 RX ORDER — PROMETHAZINE HYDROCHLORIDE 25 MG/1
12.5 TABLET ORAL EVERY 6 HOURS PRN
Status: DISCONTINUED | OUTPATIENT
Start: 2021-03-03 | End: 2021-03-06 | Stop reason: HOSPADM

## 2021-03-03 RX ORDER — HEPARIN SODIUM 10000 [USP'U]/100ML
5-30 INJECTION, SOLUTION INTRAVENOUS CONTINUOUS
Status: DISCONTINUED | OUTPATIENT
Start: 2021-03-03 | End: 2021-03-03

## 2021-03-03 RX ADMIN — IOPAMIDOL 75 ML: 755 INJECTION, SOLUTION INTRAVENOUS at 17:00

## 2021-03-03 RX ADMIN — APIXABAN 10 MG: 5 TABLET, FILM COATED ORAL at 17:43

## 2021-03-03 RX ADMIN — SODIUM CHLORIDE, PRESERVATIVE FREE 10 ML: 5 INJECTION INTRAVENOUS at 21:37

## 2021-03-03 ASSESSMENT — PAIN DESCRIPTION - ONSET: ONSET: ON-GOING

## 2021-03-03 ASSESSMENT — PAIN DESCRIPTION - FREQUENCY
FREQUENCY: CONTINUOUS
FREQUENCY: CONTINUOUS

## 2021-03-03 ASSESSMENT — PAIN - FUNCTIONAL ASSESSMENT: PAIN_FUNCTIONAL_ASSESSMENT: PREVENTS OR INTERFERES SOME ACTIVE ACTIVITIES AND ADLS

## 2021-03-03 ASSESSMENT — PAIN DESCRIPTION - LOCATION: LOCATION: ARM

## 2021-03-03 ASSESSMENT — PAIN DESCRIPTION - PAIN TYPE
TYPE: ACUTE PAIN
TYPE: ACUTE PAIN

## 2021-03-03 ASSESSMENT — PAIN DESCRIPTION - PROGRESSION: CLINICAL_PROGRESSION: GRADUALLY WORSENING

## 2021-03-03 NOTE — ED PROVIDER NOTES
[address] [facility]  Department of Emergency Medicine  UC Encounter Note  3/3/21  6:52 PM EST    NAME: Bhargavi Barron  :  1970  MRN:  [id number]    Chief Complaint: Arm Pain (Pt states \"I was in the Hospital couple weeks ago with CP & they Put IV in my Right arm & it bothered me some but now the Pain is moving up My arm\" )      This is a 80-year-old male that presents to urgent care complaining of tenderness to his right arm for the past several weeks. He states last month he was admitted to the hospital for chest pain. He was ultimately discharged but still states having some right antecubital discomfort where he says they put an IV in during his hospital stay. He denies chest pain or shortness of breath. He denies injury. Denies fevers or chills. On first contact patient he appears to be in no acute distress. Review of Systems  Pertinent positives and negatives are stated within HPI, all other systems reviewed and are negative. Physical Exam  Vitals signs and nursing note reviewed. Constitutional:       Appearance: He is well-developed. HENT:      Head: Normocephalic and atraumatic. Jaw: No trismus. Right Ear: Hearing, tympanic membrane, ear canal and external ear normal.      Left Ear: Hearing, tympanic membrane, ear canal and external ear normal.      Nose: Nose normal.      Right Sinus: No maxillary sinus tenderness or frontal sinus tenderness. Left Sinus: No maxillary sinus tenderness or frontal sinus tenderness. Mouth/Throat:      Pharynx: Uvula midline. No uvula swelling. Eyes:      General: Lids are normal.      Conjunctiva/sclera: Conjunctivae normal.      Pupils: Pupils are equal, round, and reactive to light. Neck:      Musculoskeletal: Normal range of motion and neck supple. Cardiovascular:      Rate and Rhythm: Normal rate and regular rhythm. Heart sounds: Normal heart sounds. No murmur.    Pulmonary:      Effort: Pulmonary effort is normal.      Breath sounds: Normal breath sounds. Abdominal:      General: Bowel sounds are normal.      Palpations: Abdomen is soft. Abdomen is not rigid. Tenderness: There is no abdominal tenderness. There is no guarding or rebound. Musculoskeletal:      Right shoulder: Normal.      Right wrist: Normal.      Comments: Tenderness to the right antecubital space where there is a prominent blood vessel there but there is no redness to the arm or significant swelling. No red streaking. He has palpable radial pulse. No cyanosis. Range of motion of arm is full. Skin:     General: Skin is warm and dry. Findings: No abrasion or rash. Neurological:      Mental Status: He is alert and oriented to person, place, and time. GCS: GCS eye subscore is 4. GCS verbal subscore is 5. GCS motor subscore is 6. Cranial Nerves: No cranial nerve deficit. Sensory: No sensory deficit. Coordination: Coordination normal.      Gait: Gait normal.         Procedures    MDM  Number of Diagnoses or Management Options  Superficial thrombophlebitis of right upper extremity  Diagnosis management comments: Patient was sent for ultrasound of the right upper extremity which reveals a superficial thrombophlebitis of the basilic vein. I spoke with my attending Dr. Karla Lindsey. I spoke to the patient's primary care provider who requests that the patient go to the emergency department and obtain a CTA of the chest.  Apparently that is a test that he wanted his patient to have and this was ordered several weeks ago. Patient did state that he was planning on getting the CTA soon. Patient is in no acute distress here. He is not lightheaded or dizzy. No CP. Not short of breath. He is stable at this time I spoke with the ED physician Kim Castro regarding patient and he will accept him into the ER. Patient stable to go by private vehicle. Patient agrees with the plan. --------------------------------------------- PAST HISTORY ---------------------------------------------  Past Medical History:  has a past medical history of Acid reflux, Lea esophagus, H/O cardiovascular stress test, Hiatal hernia, Hyperlipidemia, and Hypertension. Past Surgical History:  has a past surgical history that includes sinus surgery; Colonoscopy (10/2017); and Upper gastrointestinal endoscopy (11/2019). Social History:  reports that he has never smoked. He has never used smokeless tobacco. He reports current alcohol use of about 1.0 standard drinks of alcohol per week. He reports that he does not use drugs. Family History: family history includes Esophageal Cancer in his father; Graves Disease in his mother. The patients home medications have been reviewed.     Allergies: Levaquin [levofloxacin]    -------------------------------------------------- RESULTS -------------------------------------------------  Results for orders placed or performed during the hospital encounter of 03/03/21   CBC Auto Differential   Result Value Ref Range    WBC 6.5 4.5 - 11.5 E9/L    RBC 5.11 3.80 - 5.80 E12/L    Hemoglobin 15.8 12.5 - 16.5 g/dL    Hematocrit 44.9 37.0 - 54.0 %    MCV 87.9 80.0 - 99.9 fL    MCH 30.9 26.0 - 35.0 pg    MCHC 35.2 (H) 32.0 - 34.5 %    RDW 12.6 11.5 - 15.0 fL    Platelets 528 872 - 293 E9/L    MPV 9.3 7.0 - 12.0 fL    Neutrophils % 57.0 43.0 - 80.0 %    Immature Granulocytes % 0.3 0.0 - 5.0 %    Lymphocytes % 27.1 20.0 - 42.0 %    Monocytes % 12.7 (H) 2.0 - 12.0 %    Eosinophils % 1.4 0.0 - 6.0 %    Basophils % 1.5 0.0 - 2.0 %    Neutrophils Absolute 3.72 1.80 - 7.30 E9/L    Immature Granulocytes # 0.02 E9/L    Lymphocytes Absolute 1.77 1.50 - 4.00 E9/L    Monocytes Absolute 0.83 0.10 - 0.95 E9/L    Eosinophils Absolute 0.09 0.05 - 0.50 E9/L    Basophils Absolute 0.10 0.00 - 0.20 E9/L   POCT Venous   Result Value Ref Range    POC Sodium 143 132 - 146 mmol/L    POC Potassium 4.4 3.5 - 5.0 mmol/L    POC Chloride 106 100 - 108 mmol/L    POC Glucose 103 (H) 74 - 99 mg/dl    POC Creatinine 1.1 0.7 - 1.2 mg/dL    GFR Non-African American >60 >=60 mL/min/1.73    GFR  >60     Performed on SEE BELOW      US DUP UPPER EXTREMITY RIGHT VENOUS   Final Result   Superficial thrombophlebitis at the right basilic vein. No evidence of deep   venous thrombosis.             ------------------------- NURSING NOTES AND VITALS REVIEWED ---------------------------   The nursing notes within the ED encounter and vital signs as below have been reviewed. /84   Pulse 73   Temp 97.7 °F (36.5 °C) (Temporal)   Resp 18   Ht 5' 9\" (1.753 m)   Wt 220 lb (99.8 kg)   SpO2 99%   BMI 32.49 kg/m²   Oxygen Saturation Interpretation: Normal      ------------------------------------------ PROGRESS NOTES ------------------------------------------   I have spoken with the patient and discussed todays results, in addition to providing specific details for the plan of care and counseling regarding the diagnosis and prognosis. Their questions are answered at this time and they are agreeable with the plan.      --------------------------------- ADDITIONAL PROVIDER NOTES ---------------------------------      This patient is stable for discharge. I have shared the specific conditions for return, as well as the importance of follow-up. * NOTE: This report was transcribed using voice recognition software. Every effort was made to ensure accuracy; however, inadvertent computerized transcription errors may be present.    --------------------------------- IMPRESSION AND DISPOSITION ---------------------------------    IMPRESSION  1. Superficial thrombophlebitis of right upper extremity        DISPOSITION  Disposition: Discharge to [facility] ED.    Patient condition is stable       Bruno Auguste PA-C  03/03/21 8326

## 2021-03-03 NOTE — H&P
[address] [place], [place] [address]ist Group   History and Physical      CHIEF COMPLAINT:  pe    History of Present Illness:  52 y.o. male with a history of recent covid presents with PE. He was discharged from here on 216 after work-up for chest pain showed a negative stress test and a normal 2D echo. He had an IV in the right antecubital fossa at that time. 4 days later at that site he started having some irritation pain and swelling then it began to expand medially and it tracked like manner with additional swelling and pain but not erythema. So he went to urgent care finally today who did an ultrasound of the arm showing up official thrombophlebitis of the right basilic vein with no DVT. They sent him in to the ER here for a CTA which showed segmental as well as subsegmental PEs multiple. On further history he admits that the last almost 2 weeks he has had back pain at night he thought was positional.  Otherwise review of systems is negative as mentioned below    Informant(s) for H&P: Patient chart review ER doctor and wife    REVIEW OF SYSTEMS:  no fevers, chills, cp, sob, n/v, ha, vision/hearing changes, wt changes, hot/cold flashes, other open skin lesions, diarrhea, constipation, dysuria/hematuria unless noted in HPI. Complete ROS performed with the patient and is otherwise negative. PMH:  Past Medical History:   Diagnosis Date    Acid reflux     Lea esophagus     H/O cardiovascular stress test 02/16/2021    Exercise Nuclear Treadmill    Hiatal hernia     Hyperlipidemia     Hypertension        Surgical History:  Past Surgical History:   Procedure Laterality Date    COLONOSCOPY  10/2017   [facility] SINUS SURGERY      2006    UPPER GASTROINTESTINAL ENDOSCOPY  11/2019       Medications Prior to Admission:    Prior to Admission medications    Medication Sig Start Date End Date Taking?  Authorizing Provider   aspirin 81 MG chewable tablet Take 1 tablet by mouth daily  Patient not taking: nerve deficit and speech normal  Mild swelling in the right antecubital fossa mild tenderness    LABS:  Recent Labs     03/03/21  1443 03/03/21  1619   NA  --  139   K  --  4.4   CL  --  102   CO2  --  28   BUN  --  13   CREATININE 1.1 1.1   GLUCOSE  --  99   CALCIUM  --  9.7       Recent Labs     03/03/21  1440 03/03/21  1619   WBC 6.5 6.8   RBC 5.11 5.03   HGB 15.8 15.5   HCT 44.9 44.5   MCV 87.9 88.5   MCH 30.9 30.8   MCHC 35.2* 34.8*   RDW 12.6 12.6    351   MPV 9.3 9.2       Recent Labs     03/03/21  1443   POCGLU 103*         Radiology: Cta Pulmonary W Contrast    Result Date: 3/3/2021  EXAMINATION: CTA OF THE CHEST 3/3/2021 3:58 pm TECHNIQUE: CTA of the chest was performed after the administration of intravenous contrast.  Multiplanar reformatted images are provided for review. MIP images are provided for review. Dose modulation, iterative reconstruction, and/or weight based adjustment of the mA/kV was utilized to reduce the radiation dose to as low as reasonably achievable. COMPARISON: None. HISTORY: ORDERING SYSTEM PROVIDED HISTORY: r/o PE TECHNOLOGIST PROVIDED HISTORY: Reason for exam:->r/o PE Decision Support Exception->Emergency Medical Condition (MA) FINDINGS: Pulmonary Arteries: There are segmental and subsegmental pulmonary emboli to the right lower lobe, and left upper lobe. There are no large central pulmonary emboli. Mediastinum: No evidence of hilar or mediastinal adenopathy. No pericardial effusion. No aneurysmal dilatation of the thoracic aorta. No evidence of RV strain. Lungs/pleura: No evidence of airspace consolidation or pleural effusions. Atelectatic changes in the lung bases bilaterally. Upper Abdomen: Limited images of the upper abdomen are unremarkable. Soft Tissues/Bones: No acute bone or soft tissue abnormality. Segmental and subsegmental pulmonary emboli to the right lower lobe and left upper lobe. No large central pulmonary emboli. No evidence of RV strain.  Findings reported to MODESTO Beavers at 5:15 p.m. Us Dup Upper Extremity Right Venous    Result Date: 3/3/2021  EXAMINATION: DUPLEX ULTRASOUND OF THE RIGHT UPPER EXTREMITY FOR DVT, 3/3/2021 1:15 pm TECHNIQUE: Duplex ultrasound using B-mode/gray scaled imaging and Doppler spectral analysis and color flow was obtained of the deep venous structures of the upper extremity. COMPARISON: None. HISTORY: ORDERING SYSTEM PROVIDED HISTORY: swelling, had IV in arm TECHNOLOGIST PROVIDED HISTORY: Reason for exam:->swelling, had IV in arm FINDINGS: There is superficial thrombophlebitis within the right basilic vein. This is occlusive. There is normal flow and compressibility of the visualized deep venous structures. There is no evidence of echogenic thrombus. The deep veins demonstrate good compressibility with normal color flow study and spectral analysis. Superficial thrombophlebitis at the right basilic vein. No evidence of deep venous thrombosis. EKG: pending    ASSESSMENT:      Active Problems:    Bilateral pulmonary embolism ([address]    Pulmonary embolism, bilateral (HCC)  Resolved Problems:    * No resolved hospital problems. *      PLAN:    1. pE provoked from recent Covid. Currently he is not hypoxic or shortness of breath he is on room air  Heparin drip then transition to Eliquis  2D echo    Code Status: Full  DVT prophylaxis: Systemic anticoagulation    NOTE: This report was transcribed using voice recognition software.  Every effort was made to ensure accuracy; however, inadvertent computerized transcription errors may be present.     Electronically signed by Damir Andrews MD on 3/3/2021 at 6:52 PM

## 2021-03-03 NOTE — Clinical Note
Patient Class: Observation [104]   REQUIRED: Diagnosis: Pulmonary embolism, bilateral ([facility] [[id number]]   Estimated Length of Stay: Estimated stay of less than 2 midnights   Admitting Provider: Bruno Lacey   Telemetry Bed Required?: Yes

## 2021-03-03 NOTE — ED PROVIDER NOTES
ED Attending  CC: Yes 35 minutes    HPI:  3/3/21, Time: 4:15 PM LEONIDES Schrader is a 52 y.o. male presenting to the ED for right upper arm pain, beginning 3-4 days ago. The complaint has been persistent, moderate in severity, and worsened by extending the right elbow. Patient reports that he was hospitalized several weeks ago and had an IV in his right [facility]. States that he has had very mild pain there which has worsened over the last 3 to 4 days. He was evaluated at the urgent care and diagnosed with a superficial thrombophlebitis. They called his PCP who recommended ED evaluation with a CT of his chest to rule out a PE. Patient denies chest pain or shortness of breath. No history of blood clots in the past.  He has been afebrile without recent travel or sick contacts. Patient denies all other symptoms at this time. Review of Systems:   A complete review of systems was performed and pertinent positives and negatives are stated within HPI, all other systems reviewed and are negative.          --------------------------------------------- PAST HISTORY ---------------------------------------------  Past Medical History:  has a past medical history of Acid reflux, Lea esophagus, H/O cardiovascular stress test, Hiatal hernia, Hyperlipidemia, and Hypertension. Past Surgical History:  has a past surgical history that includes sinus surgery; Colonoscopy (10/2017); and Upper gastrointestinal endoscopy (11/2019). Social History:  reports that he has never smoked. He has never used smokeless tobacco. He reports current alcohol use of about 1.0 standard drinks of alcohol per week. He reports that he does not use drugs. Family History: family history includes Esophageal Cancer in his father; Graves Disease in his mother. The patients home medications have been reviewed.     Allergies: Levaquin [levofloxacin]    -------------------------------------------------- RESULTS -------------------------------------------------  All laboratory and radiology results have been personally reviewed by myself   LABS:  Results for orders placed or performed during the hospital encounter of 03/03/21   CBC Auto Differential   Result Value Ref Range    WBC 6.8 4.5 - 11.5 E9/L    RBC 5.03 3.80 - 5.80 E12/L    Hemoglobin 15.5 12.5 - 16.5 g/dL    Hematocrit 44.5 37.0 - 54.0 %    MCV 88.5 80.0 - 99.9 fL    MCH 30.8 26.0 - 35.0 pg    MCHC 34.8 (H) 32.0 - 34.5 %    RDW 12.6 11.5 - 15.0 fL    Platelets 530 502 - 246 E9/L    MPV 9.2 7.0 - 12.0 fL    Neutrophils % 60.7 43.0 - 80.0 %    Immature Granulocytes % 0.3 0.0 - 5.0 %    Lymphocytes % 24.6 20.0 - 42.0 %    Monocytes % 12.1 (H) 2.0 - 12.0 %    Eosinophils % 1.3 0.0 - 6.0 %    Basophils % 1.0 0.0 - 2.0 %    Neutrophils Absolute 4.12 1.80 - 7.30 E9/L    Immature Granulocytes # 0.02 E9/L    Lymphocytes Absolute 1.67 1.50 - 4.00 E9/L    Monocytes Absolute 0.82 0.10 - 0.95 E9/L    Eosinophils Absolute 0.09 0.05 - 0.50 E9/L    Basophils Absolute 0.07 0.00 - 0.20 N5/G   Basic Metabolic Panel w/ Reflex to MG   Result Value Ref Range    Sodium 139 132 - 146 mmol/L    Potassium reflex Magnesium 4.4 3.5 - 5.0 mmol/L    Chloride 102 98 - 107 mmol/L    CO2 28 22 - 29 mmol/L    Anion Gap 9 7 - 16 mmol/L    Glucose 99 74 - 99 mg/dL    BUN 13 6 - 20 mg/dL    CREATININE 1.1 0.7 - 1.2 mg/dL    GFR Non-African American >60 >=60 mL/min/1.73    GFR African American >60     Calcium 9.7 8.6 - 10.2 mg/dL       RADIOLOGY:  Interpreted by Radiologist.  CTA PULMONARY W CONTRAST   Final Result   Segmental and subsegmental pulmonary emboli to the right lower lobe and left   upper lobe. No large central pulmonary emboli. No evidence of RV strain.       Findings reported to MODESTO Davis at 5:15 p.m.             ------------------------- NURSING NOTES AND VITALS REVIEWED ---------------------------   The nursing notes within the ED encounter and vital signs as below have been reviewed. BP (!) 148/79   Pulse 87   Temp 98 °F (36.7 °C) (Oral)   Resp 20   Wt 220 lb (99.8 kg)   SpO2 98%   BMI 32.49 kg/m²   Oxygen Saturation Interpretation: Normal      ---------------------------------------------------PHYSICAL EXAM--------------------------------------      Constitutional/General: Alert and oriented x3, well appearing, non toxic in NAD  Head: Normocephalic and atraumatic  Eyes: PERRL, EOMI  Mouth: Oropharynx clear, handling secretions, no trismus  Neck: Supple, full ROM,   Pulmonary: Lungs clear to auscultation bilaterally, no wheezes, rales, or rhonchi. Not in respiratory distress  Cardiovascular:  Regular rate and rhythm, no murmurs, gallops, or rubs. 2+ distal pulses  Abdomen: Soft, non tender, non distended,   Extremities: Moves all extremities x 4. Warm and well perfused, palpable nodule in the right [facility] which is tender to palpate. No erythema or drainage. No fluctuance. Skin: warm and dry without rash  Neurologic: GCS 15,  Psych: Normal Affect      ------------------------------ ED COURSE/MEDICAL DECISION MAKING----------------------  Medications   iopamidol (ISOVUE-370) 76 % injection 75 mL (75 mLs Intravenous Given 3/3/21 1700)   apixaban (ELIQUIS) tablet 10 mg (10 mg Oral Given 3/3/21 1743)         ED COURSE:       Medical Decision Making:    Patient is a 44-year-old male presenting to the emergency department for CT of his chest to rule out a PE. Patient was found to have bilateral pulmonary emboli on imaging studies. Patient is hemodynamically stable. Vitals stable. Patient denies chest pain or shortness of breath. Patient will be initiated on oral Eliquis and monitored overnight on telemetry. Patient is agreeable to this. Counseling: The emergency provider has spoken with the patient and discussed todays results, in addition to providing specific details for the plan of care and counseling regarding the diagnosis and prognosis.   Questions are answered at this time and they are agreeable with the plan.      --------------------------------- IMPRESSION AND DISPOSITION ---------------------------------    IMPRESSION  1. Acute pulmonary embolism without acute cor pulmonale, unspecified pulmonary embolism type (HCC)        DISPOSITION  Disposition: Admit to telemetry  Patient condition is stable      NOTE: This report was transcribed using voice recognition software.  Every effort was made to ensure accuracy; however, inadvertent computerized transcription errors may be present        Twyla Saenzma  03/03/21 1746

## 2021-03-04 LAB
APTT: 35.3 SEC (ref 24.5–35.1)
APTT: 96.6 SEC (ref 24.5–35.1)
BASOPHILS ABSOLUTE: 0.07 E9/L (ref 0–0.2)
BASOPHILS RELATIVE PERCENT: 1.2 % (ref 0–2)
EOSINOPHILS ABSOLUTE: 0.13 E9/L (ref 0.05–0.5)
EOSINOPHILS RELATIVE PERCENT: 2.2 % (ref 0–6)
HCT VFR BLD CALC: 43 % (ref 37–54)
HEMOGLOBIN: 14.8 G/DL (ref 12.5–16.5)
IMMATURE GRANULOCYTES #: 0.02 E9/L
IMMATURE GRANULOCYTES %: 0.3 % (ref 0–5)
LV EF: 63 %
LVEF MODALITY: NORMAL
LYMPHOCYTES ABSOLUTE: 1.62 E9/L (ref 1.5–4)
LYMPHOCYTES RELATIVE PERCENT: 27.3 % (ref 20–42)
MCH RBC QN AUTO: 30.6 PG (ref 26–35)
MCHC RBC AUTO-ENTMCNC: 34.4 % (ref 32–34.5)
MCV RBC AUTO: 88.8 FL (ref 80–99.9)
MONOCYTES ABSOLUTE: 0.7 E9/L (ref 0.1–0.95)
MONOCYTES RELATIVE PERCENT: 11.8 % (ref 2–12)
NEUTROPHILS ABSOLUTE: 3.39 E9/L (ref 1.8–7.3)
NEUTROPHILS RELATIVE PERCENT: 57.2 % (ref 43–80)
PDW BLD-RTO: 12.9 FL (ref 11.5–15)
PLATELET # BLD: 325 E9/L (ref 130–450)
PMV BLD AUTO: 9.4 FL (ref 7–12)
RBC # BLD: 4.84 E12/L (ref 3.8–5.8)
WBC # BLD: 5.9 E9/L (ref 4.5–11.5)

## 2021-03-04 PROCEDURE — 6360000002 HC RX W HCPCS: Performed by: INTERNAL MEDICINE

## 2021-03-04 PROCEDURE — 85025 COMPLETE CBC W/AUTO DIFF WBC: CPT

## 2021-03-04 PROCEDURE — 99225 PR SBSQ OBSERVATION CARE/DAY 25 MINUTES: CPT | Performed by: INTERNAL MEDICINE

## 2021-03-04 PROCEDURE — 36415 COLL VENOUS BLD VENIPUNCTURE: CPT

## 2021-03-04 PROCEDURE — 96374 THER/PROPH/DIAG INJ IV PUSH: CPT

## 2021-03-04 PROCEDURE — 96376 TX/PRO/DX INJ SAME DRUG ADON: CPT

## 2021-03-04 PROCEDURE — G0378 HOSPITAL OBSERVATION PER HR: HCPCS

## 2021-03-04 PROCEDURE — 6370000000 HC RX 637 (ALT 250 FOR IP): Performed by: INTERNAL MEDICINE

## 2021-03-04 PROCEDURE — 2580000003 HC RX 258: Performed by: INTERNAL MEDICINE

## 2021-03-04 PROCEDURE — 85730 THROMBOPLASTIN TIME PARTIAL: CPT

## 2021-03-04 PROCEDURE — 93308 TTE F-UP OR LMTD: CPT

## 2021-03-04 RX ORDER — HEPARIN SODIUM 1000 [USP'U]/ML
40 INJECTION, SOLUTION INTRAVENOUS; SUBCUTANEOUS PRN
Status: DISCONTINUED | OUTPATIENT
Start: 2021-03-04 | End: 2021-03-06 | Stop reason: HOSPADM

## 2021-03-04 RX ORDER — HEPARIN SODIUM 1000 [USP'U]/ML
80 INJECTION, SOLUTION INTRAVENOUS; SUBCUTANEOUS PRN
Status: DISCONTINUED | OUTPATIENT
Start: 2021-03-04 | End: 2021-03-06 | Stop reason: HOSPADM

## 2021-03-04 RX ORDER — HEPARIN SODIUM 10000 [USP'U]/100ML
5-30 INJECTION, SOLUTION INTRAVENOUS CONTINUOUS
Status: DISCONTINUED | OUTPATIENT
Start: 2021-03-04 | End: 2021-03-06 | Stop reason: HOSPADM

## 2021-03-04 RX ADMIN — HEPARIN SODIUM 18 UNITS/KG/HR: 10000 INJECTION, SOLUTION INTRAVENOUS at 11:00

## 2021-03-04 RX ADMIN — ACETAMINOPHEN 650 MG: 325 TABLET, FILM COATED ORAL at 15:43

## 2021-03-04 RX ADMIN — ACETAMINOPHEN 650 MG: 325 TABLET, FILM COATED ORAL at 23:33

## 2021-03-04 RX ADMIN — SODIUM CHLORIDE, PRESERVATIVE FREE 10 ML: 5 INJECTION INTRAVENOUS at 11:03

## 2021-03-04 RX ADMIN — HEPARIN SODIUM 18 UNITS/KG/HR: 10000 INJECTION, SOLUTION INTRAVENOUS at 23:33

## 2021-03-04 ASSESSMENT — PAIN DESCRIPTION - PAIN TYPE: TYPE: ACUTE PAIN

## 2021-03-04 ASSESSMENT — PAIN DESCRIPTION - PROGRESSION: CLINICAL_PROGRESSION: GRADUALLY WORSENING

## 2021-03-04 ASSESSMENT — PAIN SCALES - GENERAL
PAINLEVEL_OUTOF10: 0
PAINLEVEL_OUTOF10: 2

## 2021-03-04 ASSESSMENT — PAIN DESCRIPTION - LOCATION: LOCATION: HEAD

## 2021-03-04 NOTE — ED NOTES
Nurse to nurse called to the 4th floor and given to their charge nurse at this time. ER nurse to transport patient upstairs to patient's ready bed.       Sandhya Veras RN  03/03/21 2734

## 2021-03-04 NOTE — CARE COORDINATION
3/4/2021 1028 CM note: No covid test. Met with patient and his wife Maryrose Spatz at bedside for transition of care needs. Pt resides with his wife and son in 2 story home. He is independent with ADLS, drives. PCP is Dr Magen Agudelo, he uses [facility]. No hx HHC/DME/SNF. Eliquis teaching initiated. Free 30 day trial and $10 copay cards for eliquis explained and given to patient. Patient instructed to take this card with prescription to retail pharmacy to obtain 30 day supply for free and subsequent Rx will be $10/month unless insurance copay is less. Pt/wife voiced understanding and gratitude.  Plan is home, pt drove to hospital. Ad TRUONG

## 2021-03-04 NOTE — PROGRESS NOTES
P Quality Flow/Interdisciplinary Rounds Progress Note        Quality Flow Rounds held on March 4, 2021    Disciplines Attending:  Bedside Nurse, ,  and Nursing Unit [facility] [facility] Miranda was admitted on 3/3/2021  4:01 PM    Anticipated Discharge Date:  Expected Discharge Date: 03/06/21    Disposition:    Dimas Score:  Dimas Scale Score: 22    Readmission Risk              Risk of Unplanned Readmission:        0           Discussed patient goal for the day, patient clinical progression, and barriers to discharge.   The following Goal(s) of the Day/Commitment(s) have been identified:  PE, Eliquis stared, Camila Rothman  March 4, 2021

## 2021-03-05 LAB — APTT: 158.2 SEC (ref 24.5–35.1)

## 2021-03-05 PROCEDURE — 85730 THROMBOPLASTIN TIME PARTIAL: CPT

## 2021-03-05 PROCEDURE — 96376 TX/PRO/DX INJ SAME DRUG ADON: CPT

## 2021-03-05 PROCEDURE — 36415 COLL VENOUS BLD VENIPUNCTURE: CPT

## 2021-03-05 PROCEDURE — 6370000000 HC RX 637 (ALT 250 FOR IP): Performed by: INTERNAL MEDICINE

## 2021-03-05 PROCEDURE — 6360000002 HC RX W HCPCS: Performed by: INTERNAL MEDICINE

## 2021-03-05 PROCEDURE — 99225 PR SBSQ OBSERVATION CARE/DAY 25 MINUTES: CPT | Performed by: INTERNAL MEDICINE

## 2021-03-05 PROCEDURE — G0378 HOSPITAL OBSERVATION PER HR: HCPCS

## 2021-03-05 RX ORDER — PROPRANOLOL HYDROCHLORIDE 10 MG/1
10 TABLET ORAL DAILY
Status: DISCONTINUED | OUTPATIENT
Start: 2021-03-05 | End: 2021-03-06 | Stop reason: HOSPADM

## 2021-03-05 RX ORDER — PRAVASTATIN SODIUM 20 MG
20 TABLET ORAL NIGHTLY
Status: DISCONTINUED | OUTPATIENT
Start: 2021-03-05 | End: 2021-03-06 | Stop reason: HOSPADM

## 2021-03-05 RX ORDER — DEXLANSOPRAZOLE 60 MG/1
60 CAPSULE, DELAYED RELEASE ORAL DAILY
Status: DISCONTINUED | OUTPATIENT
Start: 2021-03-05 | End: 2021-03-06 | Stop reason: HOSPADM

## 2021-03-05 RX ADMIN — PRAVASTATIN SODIUM 20 MG: 20 TABLET ORAL at 21:26

## 2021-03-05 RX ADMIN — HEPARIN SODIUM 18 UNITS/KG/HR: 10000 INJECTION, SOLUTION INTRAVENOUS at 13:46

## 2021-03-05 RX ADMIN — PROPRANOLOL HYDROCHLORIDE 10 MG: 10 TABLET ORAL at 21:26

## 2021-03-05 RX ADMIN — ACETAMINOPHEN 650 MG: 325 TABLET, FILM COATED ORAL at 17:26

## 2021-03-05 RX ADMIN — DEXLANSOPRAZOLE 60 MG: 60 CAPSULE, DELAYED RELEASE ORAL at 15:47

## 2021-03-05 NOTE — CARE COORDINATION
3/5/2021 1028 CM note: No covid test. Follow up visit made to patient. He reports he had covid at [facility] time.  Free 30 day trial and $10 copay cards for eliquis explained and given to patient, however pt is agreeable for free 30 day trial through meds to bed program. Plan is home, pt drove to hospital. Ad TRUONG

## 2021-03-05 NOTE — PROGRESS NOTES
Admitting Date and Time: 3/3/2021  4:01 PM  Admit Dx: Bilateral pulmonary embolism (HCC) [I26.99]  Pulmonary embolism, bilateral ([address] [I26.99]    Subjective:  Pt feels ok  Per RN: no issues    ROS: denies fever, chills, cp, sob, n/v, HA unless stated above.  sodium chloride flush  10 mL Intravenous 2 times per day         heparin (porcine), 80 Units/kg, PRN      heparin (porcine), 40 Units/kg, PRN      sodium chloride flush, 10 mL, PRN      promethazine, 12.5 mg, Q6H PRN    Or      ondansetron, 4 mg, Q6H PRN      polyethylene glycol, 17 g, Daily PRN      acetaminophen, 650 mg, Q6H PRN    Or      acetaminophen, 650 mg, Q6H PRN      perflutren lipid microspheres, 1.5 mL, ONCE PRN         Objective:    /86   Pulse 99   Temp 98 °F (36.7 °C) (Oral)   Resp 17   Ht 5' 9\" (1.753 m)   Wt 220 lb (99.8 kg)   SpO2 96%   BMI 32.49 kg/m²   General Appearance: alert and oriented to person, place and time and in no acute distress  Skin: warm and dry  Head: normocephalic and atraumatic  Eyes: pupils equal, round, and reactive to light, extraocular eye movements intact, conjunctivae normal  Neck: neck supple and non tender without mass   Pulmonary/Chest: clear to auscultation bilaterally- no wheezes, rales or rhonchi, normal air movement, no respiratory distress  Cardiovascular: normal rate, normal S1 and S2 and no carotid bruits  Abdomen: soft, non-tender, non-distended, normal bowel sounds, no masses or organomegaly  Extremities: no cyanosis, no clubbing and no  edema  Neurologic: no cranial nerve deficit and speech normal      Recent Labs     03/03/21  1443 03/03/21  1619   NA  --  139   K  --  4.4   CL  --  102   CO2  --  28   BUN  --  13   CREATININE 1.1 1.1   GLUCOSE  --  99   CALCIUM  --  9.7       No results for input(s): ALKPHOS, PROT, LABALBU, BILITOT, AST, ALT in the last 72 hours.     Recent Labs     03/03/21  1440 03/03/21  1619 03/04/21  0431   WBC 6.5 6.8 5.9   RBC 5.11 5.03 4.84   HGB 15.8 15.5 14.8   HCT 44.9 44.5 43.0   MCV 87.9 88.5 88.8   MCH 30.9 30.8 30.6   MCHC 35.2* 34.8* 34.4   RDW 12.6 12.6 12.9    351 325   MPV 9.3 9.2 9.4           Radiology:   CTA PULMONARY W CONTRAST   Final Result   Segmental and subsegmental pulmonary emboli to the right lower lobe and left   upper lobe. No large central pulmonary emboli. No evidence of RV strain. Findings reported to MODESTO Pham at 5:15 p.m. Assessment:  Active Problems:    Bilateral pulmonary embolism (HCC)    Pulmonary embolism, bilateral (HCC)  Resolved Problems:    * No resolved hospital problems. *      Plan:  52 y.o. male with a history of recent covid presents with PE. He was discharged from here on 216 after work-up for chest pain showed a negative stress test and a normal 2D echo. He had an IV in the right antecubital fossa at that time. 4 days later at that site he started having some irritation pain and swelling then it began to expand medially and it tracked like manner with additional swelling and pain but not erythema. So he went to urgent care finally today who did an ultrasound of the arm showing up official thrombophlebitis of the right basilic vein with no DVT. They sent him in to the ER here for a CTA which showed segmental as well as subsegmental PEs multiple. On further history he admits that the last almost 2 weeks he has had back pain at night he thought was positional.  Otherwise review of systems is negative as mentioned below    1. PE provoked from recent Covid. Still not hypoxic or shortness of breath he is on room air  Had eliquis x 1 in ED. Will keep on Heparin drip x 48 then transition to Eliquis on 3/6  2D echo wnl except mild Aoritic Regurg    2. Code Status: Full  3. DVT prophylaxis: Systemic anticoagulation       NOTE: This report was transcribed using voice recognition software.  Every effort was made to ensure accuracy; however, inadvertent computerized transcription errors may be present.      Electronically signed by Jeb Christina MD on 3/5/2021 at 11:52 AM

## 2021-03-05 NOTE — PLAN OF CARE
Problem: Pain:  Goal: Pain level will decrease  Description: Pain level will decrease  3/5/2021 1406 by Kath Galan RN  Outcome: Met This Shift     Problem: Pain:  Goal: Control of acute pain  Description: Control of acute pain  3/5/2021 1406 by Kath Galan RN  Outcome: Met This Shift     Problem: Pain:  Goal: Control of chronic pain  Description: Control of chronic pain  3/5/2021 1406 by Kath Galan RN  Outcome: Met This Shift

## 2021-03-05 NOTE — CARE COORDINATION
3/5/2021 1436 CM note: No covid test. Eliquis Rx e scribed to [address] [place] [address] outpt pharmacy. Eliquis will be filled and delivered to patient's room today. Pt informed and voiced gratitude.  Plan is home, pt drove to hospital. Ad TRUONG

## 2021-03-05 NOTE — PLAN OF CARE
Problem: Pain:  Goal: Pain level will decrease  Description: Pain level will decrease  3/5/2021 0404 by May Merlin  Outcome: Met This Shift     Problem: Pain:  Goal: Control of acute pain  Description: Control of acute pain  3/5/2021 0404 by May Merlin  Outcome: Met This Shift     Problem: Pain:  Goal: Control of chronic pain  Description: Control of chronic pain  3/5/2021 0404 by May Merlin  Outcome: Met This Shift

## 2021-03-05 NOTE — PROGRESS NOTES
CLINICAL PHARMACY NOTE: MEDS TO [address] Select Patient?: No  Total # of Prescriptions Filled: 1   The following medications were delivered to the patient:  · ELIQUIS 5 MG STARTER PACK  Total # of Interventions Completed: 3  Time Spent (min): 15    Additional Documentation:

## 2021-03-06 LAB
APTT: 108.8 SEC (ref 24.5–35.1)
APTT: 90.8 SEC (ref 24.5–35.1)
HCT VFR BLD CALC: 44.6 % (ref 37–54)
HEMOGLOBIN: 15.2 G/DL (ref 12.5–16.5)
MCH RBC QN AUTO: 30.6 PG (ref 26–35)
MCHC RBC AUTO-ENTMCNC: 34.1 % (ref 32–34.5)
MCV RBC AUTO: 89.9 FL (ref 80–99.9)
PDW BLD-RTO: 13 FL (ref 11.5–15)
PLATELET # BLD: 322 E9/L (ref 130–450)
PMV BLD AUTO: 9.3 FL (ref 7–12)
RBC # BLD: 4.96 E12/L (ref 3.8–5.8)
WBC # BLD: 5.3 E9/L (ref 4.5–11.5)

## 2021-03-06 PROCEDURE — G0378 HOSPITAL OBSERVATION PER HR: HCPCS

## 2021-03-06 PROCEDURE — 36415 COLL VENOUS BLD VENIPUNCTURE: CPT

## 2021-03-06 PROCEDURE — 99217 PR OBSERVATION CARE DISCHARGE MANAGEMENT: CPT | Performed by: INTERNAL MEDICINE

## 2021-03-06 PROCEDURE — 6370000000 HC RX 637 (ALT 250 FOR IP): Performed by: INTERNAL MEDICINE

## 2021-03-06 PROCEDURE — 85730 THROMBOPLASTIN TIME PARTIAL: CPT

## 2021-03-06 PROCEDURE — 6360000002 HC RX W HCPCS: Performed by: INTERNAL MEDICINE

## 2021-03-06 PROCEDURE — 85027 COMPLETE CBC AUTOMATED: CPT

## 2021-03-06 PROCEDURE — 96376 TX/PRO/DX INJ SAME DRUG ADON: CPT

## 2021-03-06 RX ORDER — ASPIRIN 81 MG/1
81 TABLET, CHEWABLE ORAL DAILY
Qty: 30 TABLET | Refills: 0 | Status: SHIPPED | OUTPATIENT
Start: 2021-03-06 | End: 2021-06-16

## 2021-03-06 RX ADMIN — HEPARIN SODIUM 13.03 UNITS/KG/HR: 10000 INJECTION, SOLUTION INTRAVENOUS at 09:30

## 2021-03-06 RX ADMIN — PROPRANOLOL HYDROCHLORIDE 10 MG: 10 TABLET ORAL at 09:28

## 2021-03-06 RX ADMIN — DEXLANSOPRAZOLE 60 MG: 60 CAPSULE, DELAYED RELEASE ORAL at 09:28

## 2021-03-06 ASSESSMENT — PAIN SCALES - GENERAL: PAINLEVEL_OUTOF10: 0

## 2021-03-06 NOTE — DISCHARGE SUMMARY
[facility] Physician Discharge Summary       No follow-up provider specified. Activity level: Slowly increase as tolerated    Diet: DIET GENERAL; Low Sodium (2 GM)    Labs: None are pending at the discharge    Condition at discharge: Stable    Dispo: Return to home setting     Patient ID:  Alberto Navarro  [id number]  89 y.o.  1970    Admit date: 3/3/2021    Discharge date and time:  3/6/2021  11:11 AM    Admission Diagnoses: Active Problems:    Bilateral pulmonary embolism (HCC)    Pulmonary embolism, bilateral (HCC)  Resolved Problems:    * No resolved hospital problems. *      Discharge Diagnoses: Active Problems:    Bilateral pulmonary embolism (HCC)    Pulmonary embolism, bilateral (HCC)  Resolved Problems:    * No resolved hospital problems. *      Consults:  IP CONSULT TO INTERNAL MEDICINE    Procedures: None significant except if described in hospital course. Hospital Course:   52 y. o. male with a history of recent covid presents with PE. Sathish Green was discharged from here on 216 after work-up for chest pain showed a negative stress test and a normal 2D echo. Sathish Green had an IV in the right antecubital fossa at that time.  4 days later at that site he started having some irritation pain and swelling then it began to expand medially and it tracked like manner with additional swelling and pain but not erythema.  So he went to urgent care finally today who did an ultrasound of the arm showing up official thrombophlebitis of the right basilic vein with no DVT.  They sent him in to the ER here for a CTA which showed segmental as well as subsegmental PEs multiple.  On further history he admits that the last almost 2 weeks he has had back pain at night he thought was positional.  Otherwise review of systems is negative as mentioned below     PE provoked from recent Covid.  was never hypoxic or short of breath. he is on room air. Had Heparin drip x 48.   Will switch to Eliquis on d/c. Already had starter pack  with coupon from our pharmacy    2D echo wnl except mild Aoritic Regurg      Discharge Exam:  Vitals:    03/04/21 2355 03/05/21 0813 03/05/21 2115 03/06/21 0915   BP: 132/72 120/86 133/86 125/86   Pulse: 74 99 92 92   Resp: 18 17 16 18   Temp: 98.6 °F (37 °C) 98 °F (36.7 °C) 98.1 °F (36.7 °C) 98 °F (36.7 °C)   TempSrc: Oral Oral Oral Oral   SpO2: 99% 96% 97% 95%   Weight:       Height:           No acute distress moist membranes   Normocephalic, without obvious abnormality, atraumatic, external ears without lesions,   Neck supple no cervical lymphadenopathy  Heart has a regular rate and rhythm no murmur  Lungs are clear to auscultation bilaterally with equal movements. Abdomen is soft nontender nondistended no rebound or guarding. No  significant peripheral edema good peripheral perfusion. No significant rashes or new skin lesions. No new focality on neuro exam which is overall grossly intact. Affect and mood seem to be appropriate     I/O last 3 completed shifts: In: 960 [P.O.:960]  Out: -   No intake/output data recorded.       LABS:  Recent Labs     03/03/21  1443 03/03/21  1619   NA  --  139   K  --  4.4   CL  --  102   CO2  --  28   BUN  --  13   CREATININE 1.1 1.1   GLUCOSE  --  99   CALCIUM  --  9.7       Recent Labs     03/03/21  1619 03/04/21  0431 03/06/21  0703   WBC 6.8 5.9 5.3   RBC 5.03 4.84 4.96   HGB 15.5 14.8 15.2   HCT 44.5 43.0 44.6   MCV 88.5 88.8 89.9   MCH 30.8 30.6 30.6   MCHC 34.8* 34.4 34.1   RDW 12.6 12.9 13.0    325 322   MPV 9.2 9.4 9.3       Recent Labs     03/03/21  1443   POCGLU 103*         Imaging:  Echo Complete    Result Date: 3/4/2021  Transthoracic Echocardiography Report (TTE)  Demographics   Patient Name    Ova J Luis Gender            Male                  C   Medical Record  [id number]      Room Number       5382  Number   Account #       [de-identified]     Procedure Date    03/04/2021   Corporate ID Ordering                                Physician   Accession       [id number]    Referring         Jaclyn Yip  Number                        Physician   Date of Birth   1970    Sonographer       Adriel Delgado [facility]   Age             52 year(s)    Interpreting      Mariah 66                                Physician         Physician Cardiology                                                  Gustavo Clark MD                                 Any Other  Procedure Type of Study   TTE procedure:Echo Limited Study. Procedure Date Date: 03/04/2021 Start: 08:01 AM Study Location: Echo Lab Technical Quality: Poor visualization due to body habitus. Indications:Pulmonary embolus. Patient Status: Routine Height: 69 inches Weight: 220 pounds BSA: 2.15 m^2 BMI: 32.49 kg/m^2  Findings   Left Ventricle  Normal left ventricular chamber size. Normal left ventricular systolic function. Visually estimated LVEF is 60-65 %. No wall motion abnormalities. Right Ventricle  Normal right ventricle structure and function. Right Atrium  Normal right atrium size. Mitral Valve  Normal mitral valve structure and function. No mitral valve regurgitation. Tricuspid Valve  Normal tricuspid valve structure and function. Trace tricuspid valve regurgitation. Aortic Valve  Mild aortic regurgitation. Pericardial Effusion  No pericardial effusion. Conclusions   Summary  Normal left ventricular chamber size. Normal left ventricular systolic function. Visually estimated LVEF is 60-65 %. No wall motion abnormalities. Normal right ventricle structure and function. Mild aortic regurgitation.    Signature   ----------------------------------------------------------------  Electronically signed by Gustavo Clark MD(Interpreting  physician) on 03/04/2021 02:00 PM  ----------------------------------------------------------------  M-Mode/2D Measurements & Calculations   LV Diastolic    LV Systolic Dimension: 2.8 cm AV Cusp Separation: 2.2 cmLA  Dimension: 4.5  LV Volume Diastolic: 19.8 ml  Dimension: 4 cmAO Root  cm              LV Volume Systolic: 64.9 ml   Dimension: 3.7 cm  LV FS:37.8 %    LV EDV/LV EDV Index: 82.4  LV PW           ml/38 ml/m^2LV ESV/LV ESV  Diastolic: 1 cm Index: 93.3 ml/13ml/ m^2  LV PW Systolic: EF Calculated: 21.9 %         RV Diastolic Dimension: 3.6  1.8 cm          LV Mass Index: 71 l/min*m^2   cm  Septum                                        RV Systolic Dimension: 2.5  Diastolic: 1 cm                               cm  Septum          LVOT: 2 cm                    LA/Aorta: 3.16  Systolic: 1.9  cm   LV Mass: 153.27  g  Doppler Measurements & Calculations    AV Peak Velocity: 1.2 m/s    LVOT Peak Velocity: 0.97 m/s   AV Peak Gradient: 5.74 mmHg  LVOT Mean Velocity: 0.66 m/s   AV Mean Velocity: 0.92 m/s   LVOT Peak Gradient: 3.7 mmHgLVOT Mean   AV Mean Gradient: 3.7 mmHg   Gradient: 1.9 mmHg   AV VTI: 24.4 cm              Estimated RVSP: 15.7 mmHg   AV Area (Continuity):2.51    Estimated RAP:10 mmHg   cm^2   AV Deceleration Time: 3032.7   msec                         TR Velocity:1.19 m/s   LVOT VTI: 19.5 cm            TR Gradient:5.65 mmHg                                PV Peak Velocity: 0.76 m/s   Estimated PASP: 15.65 mmHg   PV Peak Gradient: 2.3 mmHg                                PV Mean Velocity: 0.54 m/s                                PV Mean Gradient: 1.3 mmHg  [email or web address] [id number]    Cta Pulmonary W Contrast    Result Date: 3/3/2021  EXAMINATION: CTA OF THE CHEST 3/3/2021 3:58 pm TECHNIQUE: CTA of the chest was performed after the administration of intravenous contrast.  Multiplanar reformatted images are provided for review. MIP images are provided for review.  Dose modulation, iterative reconstruction, and/or weight based adjustment of the mA/kV was utilized to reduce the radiation dose to as low as reasonably achievable. COMPARISON: None. HISTORY: ORDERING SYSTEM PROVIDED HISTORY: r/o PE TECHNOLOGIST PROVIDED HISTORY: Reason for exam:->r/o PE Decision Support Exception->Emergency Medical Condition (MA) FINDINGS: Pulmonary Arteries: There are segmental and subsegmental pulmonary emboli to the right lower lobe, and left upper lobe. There are no large central pulmonary emboli. Mediastinum: No evidence of hilar or mediastinal adenopathy. No pericardial effusion. No aneurysmal dilatation of the thoracic aorta. No evidence of RV strain. Lungs/pleura: No evidence of airspace consolidation or pleural effusions. Atelectatic changes in the lung bases bilaterally. Upper Abdomen: Limited images of the upper abdomen are unremarkable. Soft Tissues/Bones: No acute bone or soft tissue abnormality. Segmental and subsegmental pulmonary emboli to the right lower lobe and left upper lobe. No large central pulmonary emboli. No evidence of RV strain. Findings reported to MODESTO Gonzalez at 5:15 p.m. Us Dup Upper Extremity Right Venous    Result Date: 3/3/2021  EXAMINATION: DUPLEX ULTRASOUND OF THE RIGHT UPPER EXTREMITY FOR DVT, 3/3/2021 1:15 pm TECHNIQUE: Duplex ultrasound using B-mode/gray scaled imaging and Doppler spectral analysis and color flow was obtained of the deep venous structures of the upper extremity. COMPARISON: None. HISTORY: ORDERING SYSTEM PROVIDED HISTORY: swelling, had IV in arm TECHNOLOGIST PROVIDED HISTORY: Reason for exam:->swelling, had IV in arm FINDINGS: There is superficial thrombophlebitis within the right basilic vein. This is occlusive. There is normal flow and compressibility of the visualized deep venous structures. There is no evidence of echogenic thrombus. The deep veins demonstrate good compressibility with normal color flow study and spectral analysis. Superficial thrombophlebitis at the right basilic vein. No evidence of deep venous thrombosis. Patient Instructions:   Current Discharge Medication List      START taking these medications    Details   apixaban (ELIQUIS) 5 MG TABS tablet Take 2 tablets by mouth 2 times daily for 7 days  Qty: 28 tablet, Refills: 0      apixaban starter pack (ELIQUIS) 5 MG TBPK tablet Take 1 tablet by mouth See Admin Instructions 10 mg twice a day for first 7 days then 5 mg twice a day  Qty: 60 each, Refills: 0         CONTINUE these medications which have CHANGED    Details   aspirin 81 MG chewable tablet Take 1 tablet by mouth daily  Qty: 30 tablet, Refills: 0         CONTINUE these medications which have NOT CHANGED    Details   propranolol (INDERAL) 10 MG tablet Take 1 tablet by mouth 3 times daily  Qty: 90 tablet, Refills: 3      pravastatin (PRAVACHOL) 20 MG tablet Take 1 tablet by mouth daily  Qty: 90 tablet, Refills: 1      Multiple Vitamins-Minerals (THERAPEUTIC MULTIVITAMIN-MINERALS) tablet Take 1 tablet by mouth daily      thiamine mononitrate (GNP VITAMIN B-1) 100 MG tablet Take 100 mg by mouth daily      Omega-3 Fatty Acids (FISH OIL) 500 MG CAPS Take 500 mg by mouth daily      dexlansoprazole (DEXILANT) 60 MG CPDR capsule Take 60 mg by mouth daily. Note that more than 30 minutes was spent in preparing discharge papers, discussing discharge with patient, medication review, etc.    NOTE: This report was transcribed using voice recognition software. Every effort was made to ensure accuracy; however, inadvertent computerized transcription errors may be present.      Signed:  Electronically signed by Marilia Mak MD on 3/6/2021 at 11:11 AM

## 2021-03-06 NOTE — PLAN OF CARE
Problem: Pain:  Goal: Pain level will decrease  Description: Pain level will decrease  3/6/2021 0008 by James Ruiz RN  Outcome: Met This Shift     Problem: Pain:  Goal: Control of acute pain  Description: Control of acute pain  3/6/2021 0008 by James Ruiz RN  Outcome: Met This Shift     Problem: Pain:  Goal: Control of chronic pain  Description: Control of chronic pain  3/6/2021 0008 by James Ruiz RN  Outcome: Met This Shift

## 2021-03-08 ENCOUNTER — CARE COORDINATION (OUTPATIENT)
Dept: CASE MANAGEMENT | Age: 51
End: 2021-03-08

## 2021-03-08 ENCOUNTER — TELEPHONE (OUTPATIENT)
Dept: ADMINISTRATIVE | Age: 51
End: 2021-03-08

## 2021-03-08 VITALS
DIASTOLIC BLOOD PRESSURE: 86 MMHG | BODY MASS INDEX: 32.58 KG/M2 | WEIGHT: 220 LBS | HEIGHT: 69 IN | HEART RATE: 92 BPM | SYSTOLIC BLOOD PRESSURE: 125 MMHG | TEMPERATURE: 98 F | RESPIRATION RATE: 18 BRPM | OXYGEN SATURATION: 95 %

## 2021-03-08 NOTE — CARE COORDINATION
[facility] Transitions Initial Follow Up Call    Call within 2 business days of discharge: Yes    Patient: Shakeel Moralez Patient : 1970   MRN: [id number]  Reason for Admission: Bilateral Pulmonary Embolism  Discharge Date: 3/6/21 RARS: No data recorded    Last Discharge [facility]       Complaint Diagnosis Description Type Department Provider    3/3/21 Other; Arm Pain Acute pulmonary embolism without acute cor pulmonale, unspecified pulmonary embolism type [facility]) ED to Hosp-Admission (Discharged) (ADMITTED) NATASHA Quiroz TELE Shannan Rosas MD; Anibal Sees. .. 3/3/21 Arm Pain Superficial thrombophlebitis of right upper extremity UC (DISCHARGE) NATASHA NORRIS         Challenges to be reviewed by the provider   Additional needs identified to be addressed with provider No  none             Method of communication with provider : none    Discussed COVID-19 related testing which was not done at this time. Test results were not done. Patient informed of results, if available? No COVID testing done with recent admission. Advance Care Planning:   Does patient have an Advance Directive:  decision maker updated. Was this a readmission? Yes  Patient stated reason for admission: swelling to right arm and blood clots in lungs  Patients top risk factors for readmission: medical condition and polypharmacy    Care Transition Nurse (CTN) contacted the patient by telephone to perform post hospital discharge assessment. Verified name and  with patient as identifiers. Provided introduction to self, and explanation of the CTN role. CTN reviewed discharge instructions, medical action plan and red flags with patient who verbalized understanding. Patient given an opportunity to ask questions and does not have any further questions or concerns at this time. Were discharge instructions available to patient? Yes.  Reviewed appropriate site of care based on symptoms and resources available to patient including: PCP, Urgent care clinics, When to call 911 and Condition related references. The patient agrees to contact the PCP office for questions related to their healthcare. Medication reconciliation was performed with patient, who verbalizes understanding of administration of home medications. Advised obtaining a 90-day supply of all daily and as-needed medications. Covid Risk Education    Patient has following risk factors of: no known risk factors. Education provided regarding infection prevention, and signs and symptoms of COVID-19 and when to seek medical attention with patient who verbalized understanding. Discussed exposure protocols and quarantine From CDC: Are you at higher risk for severe illness?   and given an opportunity for questions and concerns. The patient agrees to contact the COVID-19 hotline [phone] or PCP office for questions related to COVID-19. For more information on steps you can take to protect yourself, see CDC's How to Protect Yourself     Was patient discharged with a pulse oximeter? No Discussed and confirmed pulse oximeter discharge instructions and when to notify provider or seek emergency care. Patient/family/caregiver given information for [facility] and agrees to enroll no  Patient's preferred e-mail: declines  Patient's preferred phone number: declines    Discussed follow-up appointments. If no appointment was previously scheduled, appointment scheduling offered: Yes. Is follow up appointment scheduled within 7 days of discharge? No  Non-[facility] follow up appointment(s): CTN confirmed with patient he is scheduled to follow up with Dr. Jocelin Bettencourt (PCP) on 4/2/21 but has a call out to office and awaiting a return call about moving appt to be seen sooner. Plan for follow-up call in 5-7 days based on severity of symptoms and risk factors.     Non-face-to-face services provided:  Scheduled appointment with PCP-CTN confirmed with patient he is scheduled to follow up with Dr. Pierre Munoz (PCP) on 4/2/21 but has a call out to office and awaiting a return call about moving appt to be seen sooner. Obtained and reviewed discharge summary and/or continuity of care documents  Assessment and support for treatment adherence and medication management-Advised to take Eliquis as directed and he is starting on loading dose of 10 mg twice ialy for 7 days then down to 5 mg twice a day thereafter. Also, discussed bleeding precautions associated with blood thinner therapy and knowing when to seek immediate medical attention. Care Transitions 24 Hour Call    Schedule Follow Up Appointment with PCP: Declined  Do you have any ongoing symptoms?: No  Do you have a copy of your discharge instructions?: Yes  Do you have all of your prescriptions and are they filled?: Yes  Have you been contacted by a Annamarie Dakins Pharmacist?: No  Have you scheduled your follow up appointment?: No  Were you discharged with any Home Care or Post Acute Services: No  Do you feel like you have everything you need to keep you well at home?: Yes  Care Transitions Interventions       Spoke with patient today 3/8/21 for TCM/hospital discharge follow up. Confirmed patient was admitted to [address] [place] [address] for bilateral pulmonary embolism. Patient states on day of admission he initially went to urgent care for right arm swelling/redness which was from IV site when in hospital last month. Noted ultrasound that was obtained showed thrombophlebitis. States PCP wanted admitted and was subsequently transferred to [address] [place] [address] for admission. Confirmed patient was admitted to [address] [place] [address] 2/15-2/16 for chest pain. Cardiac stress test was obtained during that admission which was negative. Echo was then obtained during this admission which showed EF 60%-65%. One admitted on 3/3/21, CTA confirmed PE to right lower lobe and left upper lobe. Patient states he is feeling better since discharge.  Denies any shortness of breath, chest pain, chest discomfort, arm pain, calf pain, nausea, vomiting, diarrhea, chills or fever. No noted COVID testing performed during this admission but patient advises he was COVID positive in December. CTN explained that COVID can cause blood thickening and lead to clots which he verbalizes understanding. Confirmed with patient he obtained Eliquis newly ordered on discharge and started loading dose. Reviewed loading dose of Eliquis 10 mg twice daily for 7 days then decrease to 5 mg twice daily thereafter. Discussed bleeding precautions associated with blood thinner therapy and knowing when to seek immediate medical attention. Confirmed with patient he is scheduled to follow up with Dr. Jocelin Bettencourt (PCP) on 4/2/21 but has call out to office and awaiting return call to see if he can be seen sooner. Denies any other complaints or concerns at this time. CTN will continue to follow. Follow Up  Future Appointments   Date Time Provider Sangita Corey   4/2/2021  9:15 AM Ye Estrada MD [facility]     CTN provided contact information for future needs.     UMAIR Gutiérrez

## 2021-03-15 ENCOUNTER — OFFICE VISIT (OUTPATIENT)
Dept: FAMILY MEDICINE CLINIC | Age: 51
End: 2021-03-15
Payer: COMMERCIAL

## 2021-03-15 ENCOUNTER — CARE COORDINATION (OUTPATIENT)
Dept: CASE MANAGEMENT | Age: 51
End: 2021-03-15

## 2021-03-15 VITALS
SYSTOLIC BLOOD PRESSURE: 110 MMHG | HEART RATE: 55 BPM | WEIGHT: 222 LBS | OXYGEN SATURATION: 99 % | BODY MASS INDEX: 32.78 KG/M2 | DIASTOLIC BLOOD PRESSURE: 74 MMHG

## 2021-03-15 DIAGNOSIS — E78.5 HYPERLIPIDEMIA, UNSPECIFIED HYPERLIPIDEMIA TYPE: ICD-10-CM

## 2021-03-15 DIAGNOSIS — Z00.00 PE (PHYSICAL EXAM), ANNUAL: Primary | ICD-10-CM

## 2021-03-15 DIAGNOSIS — G43.809 OTHER MIGRAINE WITHOUT STATUS MIGRAINOSUS, NOT INTRACTABLE: ICD-10-CM

## 2021-03-15 DIAGNOSIS — K22.70 BARRETT'S ESOPHAGUS WITHOUT DYSPLASIA: ICD-10-CM

## 2021-03-15 PROCEDURE — G8427 DOCREV CUR MEDS BY ELIG CLIN: HCPCS | Performed by: INTERNAL MEDICINE

## 2021-03-15 PROCEDURE — 1111F DSCHRG MED/CURRENT MED MERGE: CPT | Performed by: INTERNAL MEDICINE

## 2021-03-15 PROCEDURE — G8417 CALC BMI ABV UP PARAM F/U: HCPCS | Performed by: INTERNAL MEDICINE

## 2021-03-15 PROCEDURE — 99213 OFFICE O/P EST LOW 20 MIN: CPT | Performed by: INTERNAL MEDICINE

## 2021-03-15 PROCEDURE — G8482 FLU IMMUNIZE ORDER/ADMIN: HCPCS | Performed by: INTERNAL MEDICINE

## 2021-03-15 PROCEDURE — 1036F TOBACCO NON-USER: CPT | Performed by: INTERNAL MEDICINE

## 2021-03-15 NOTE — CARE COORDINATION
[facility] Trans[facility] Follow Up Call    3/15/2021    Patient: Julious Habermann  Patient : 1970   MRN: [id number]  Reason for Admission: Bilateral Pulmonary Embolism  Discharge Date: 3/6/21 RARS: No data recorded       Attempted to contact patient today 3/15/21 for final hospital discharge/COVID follow up. Left message requesting a return call back to CTN and provided contact information. CTN signing off if no return call.     Tasha Keith, APRN

## 2021-03-15 NOTE — PROGRESS NOTES
Post-Discharge Transitional Care Management Services or Hospital Follow Up      Silvia Beal   YOB: 1970    Date of Office Visit:  3/15/2021  Date of Hospital Admission: 3/3/21  Date of Hospital Discharge: 3/6/21  Risk of hospital readmission (high >=14%. Medium >=10%) :No data recorded    Care management risk score Rising risk (score 2-5) and Complex Care (Scores >=6): 0     Non face to face  following discharge, date last encounter closed (first attempt may have been earlier): 3/8/2021  2:58 PM    Call initiated 2 business days of discharge: Yes    Patient Active Problem List   Diagnosis    Chest pain    Hypertension    Hyperlipidemia    Hiatal hernia    Lea esophagus    Acid reflux    Class 1 obesity due to excess calories without serious comorbidity with body mass index (BMI) of 32.0 to 32.9 in adult    Bilateral pulmonary embolism (HCC)    Pulmonary embolism, bilateral (HCC)       Allergies   Allergen Reactions    Levaquin [Levofloxacin] Other (See Comments)     joint pain       Medications listed as ordered at the time of discharge from hospital   [address] Medication Instructions ERIN:    Printed on:03/15/21 7203   Medication Information                      apixaban (ELIQUIS) 5 MG TABS tablet  Take 1 tablet by mouth 2 times daily             apixaban starter pack (ELIQUIS) 5 MG TBPK tablet  Take 1 tablet by mouth See Admin Instructions 10 mg twice a day for first 7 days then 5 mg twice a day             aspirin 81 MG chewable tablet  Take 1 tablet by mouth daily             dexlansoprazole (DEXILANT) 60 MG CPDR capsule  Take 60 mg by mouth daily.                Multiple Vitamins-Minerals (THERAPEUTIC MULTIVITAMIN-MINERALS) tablet  Take 1 tablet by mouth daily             Omega-3 Fatty Acids (FISH OIL) 500 MG CAPS  Take 500 mg by mouth daily             pravastatin (PRAVACHOL) 20 MG tablet  Take 1 tablet by mouth daily             propranolol (INDERAL) 10 MG tablet  Take 1 tablet by mouth 3 times daily             thiamine mononitrate (GNP VITAMIN B-1) 100 MG tablet  Take 100 mg by mouth daily                   Medications marked \"taking\" at this time  Outpatient Medications Marked as Taking for the 3/15/21 encounter (Office Visit) with Lita Baird MD   Medication Sig Dispense Refill    apixaban (ELIQUIS) 5 MG TABS tablet Take 1 tablet by mouth 2 times daily 60 tablet 3        Medications patient taking as of now reconciled against medications ordered at time of hospital discharge: Yes    Chief Complaint   Patient presents with   [address] from Hospital       History of Present illness - Follow up of Hospital diagnosis(es): Pulmonary embolism  This is a 52years old male who was admitted in the hospital on February 15, 2021 and discharged on 5/16/2021 with chest pain he had a stress test which was seen by cardiologist negative he was discharged home  He was still complaining of scapular pain  I saw him on 5/24/2021 I recommended a CTA of the chest to rule out PE  They had to get approval from insurance got rescheduled for him    In between his developed swelling in the right arm  He was seen at urgent care, I was called by the urgent care physician advised the patient was sent to the ER to have a CTA chest which was done found to have bilateral PE he was admitted  Started on Eliquis  He is feeling much better the pain is gone . Denies any bleeding or bruising from the Eliquis  Denies any dysphagia    Inpatient course: Discharge summary reviewed- see chart. Discharge summary reviewed      Interval history/Current status:   Patient is doing very well feeling much better since discharge from the hospital    A comprehensive review of systems was negative except for what was noted in the HPI. Vitals:    03/15/21 1504   BP: 110/74   Pulse: 55   SpO2: 99%   Weight: 222 lb (100.7 kg)     Body mass index is 32.78 kg/m².    Wt Readings from Last 3 Encounters: 03/15/21 222 lb (100.7 kg)   03/03/21 220 lb (99.8 kg)   03/03/21 220 lb (99.8 kg)     BP Readings from Last 3 Encounters:   03/15/21 110/74   03/06/21 125/86   03/03/21 124/84        Physical Exam:    Obese comfortable  Neck no JVD no bruit  Lungs clear no rales or rhonchi no crackles  Coronary regular no S3 no gallop or rub  Abdomen soft nontender no masses  Extremity no edema  Neuro alert awake oriented no focal deficit          Assessment/Plan:  As patient had bilateral PE etiology still needs to be determined, this is a complication of ELBA-24  Or whether patient does have hyper granular state  So referred to hematology oncology  Dr Paige Dobbins    His migraines are doing very well      Has Lea's esophagus follow-up with Dr. Queen Edmond in the next few weeks once he has been on the Eliquis for few weeks    Low-cholesterol low-fat diet          Medical Decision Making: moderate complexity

## 2021-03-31 ENCOUNTER — OFFICE VISIT (OUTPATIENT)
Dept: FAMILY MEDICINE CLINIC | Age: 51
End: 2021-03-31
Payer: COMMERCIAL

## 2021-03-31 VITALS
OXYGEN SATURATION: 97 % | HEART RATE: 91 BPM | BODY MASS INDEX: 32.44 KG/M2 | RESPIRATION RATE: 17 BRPM | WEIGHT: 219 LBS | SYSTOLIC BLOOD PRESSURE: 114 MMHG | TEMPERATURE: 99.4 F | DIASTOLIC BLOOD PRESSURE: 78 MMHG | HEIGHT: 69 IN

## 2021-03-31 DIAGNOSIS — R50.9 FEVER, UNSPECIFIED FEVER CAUSE: ICD-10-CM

## 2021-03-31 DIAGNOSIS — U07.1 COVID-19 VIRUS DETECTED: Primary | ICD-10-CM

## 2021-03-31 DIAGNOSIS — R19.7 DIARRHEA, UNSPECIFIED TYPE: ICD-10-CM

## 2021-03-31 DIAGNOSIS — R52 BODY ACHES: ICD-10-CM

## 2021-03-31 LAB
Lab: ABNORMAL
PERFORMING INSTRUMENT: ABNORMAL
QC PASS/FAIL: ABNORMAL
SARS-COV-2, POC: DETECTED

## 2021-03-31 PROCEDURE — 87426 SARSCOV CORONAVIRUS AG IA: CPT | Performed by: PHYSICIAN ASSISTANT

## 2021-03-31 PROCEDURE — 1036F TOBACCO NON-USER: CPT | Performed by: PHYSICIAN ASSISTANT

## 2021-03-31 PROCEDURE — 99213 OFFICE O/P EST LOW 20 MIN: CPT | Performed by: PHYSICIAN ASSISTANT

## 2021-03-31 PROCEDURE — G8417 CALC BMI ABV UP PARAM F/U: HCPCS | Performed by: PHYSICIAN ASSISTANT

## 2021-03-31 PROCEDURE — G8427 DOCREV CUR MEDS BY ELIG CLIN: HCPCS | Performed by: PHYSICIAN ASSISTANT

## 2021-03-31 PROCEDURE — G8482 FLU IMMUNIZE ORDER/ADMIN: HCPCS | Performed by: PHYSICIAN ASSISTANT

## 2021-03-31 RX ORDER — AZITHROMYCIN 250 MG/1
TABLET, FILM COATED ORAL
Qty: 6 TABLET | Refills: 0 | Status: SHIPPED
Start: 2021-03-31 | End: 2021-06-16

## 2021-03-31 RX ORDER — PREDNISONE 10 MG/1
TABLET ORAL
Qty: 21 TABLET | Refills: 0 | Status: SHIPPED
Start: 2021-03-31 | End: 2021-06-16

## 2021-03-31 NOTE — Clinical Note
Patient declined ER, I did place him on Zithromax and Prednisone and advised him to monitor his symptoms closely.  Thanks  Vianey Kelly PA-C

## 2021-03-31 NOTE — PATIENT INSTRUCTIONS
Patient Education        10 Things to Do When You Have COVID-19    Stay home. Don't go to school, work, or public areas. And don't use public transportation, ride-shares, or taxis unless you have no choice. Leave your home only if you need to get medical care. But call the doctor's office first so they know you're coming. And wear a cloth face cover. Ask before leaving isolation. Talk with your doctor or other health professional about when it will be safe for you to leave isolation. Wear a cloth face cover when you are around other people. It can help stop the spread of the virus when you cough or sneeze. Limit contact with people in your home. If possible, stay in a separate bedroom and use a separate bathroom. Avoid contact with pets and other animals. If possible, have a friend or family member care for them while you're sick. Cover your mouth and nose with a tissue when you cough or sneeze. Then throw the tissue in the trash right away. Wash your hands often, especially after you cough or sneeze. Use soap and water, and scrub for at least 20 seconds. If soap and water aren't available, use an alcohol-based hand . Don't share personal household items. These include bedding, towels, cups and glasses, and eating utensils. Clean and disinfect your home every day. Use household  or disinfectant wipes or sprays. Take special care to clean things that you grab with your hands. These include doorknobs, remote controls, phones, and handles on your refrigerator and microwave. And don't forget countertops, tabletops, bathrooms, and computer keyboards. Take acetaminophen (Tylenol) to relieve fever and body aches. Read and follow all instructions on the label. Current as of: December 18, 2020               Content Version: 12.8  © 2006-2021 [employer]. Care instructions adapted under license by [employer].  If you have questions about a medical condition or this instruction, always ask your healthcare professional. [place] [address] any warranty or liability for your use of this information. Patient Education        Coronavirus (EOWLB-41): Care Instructions  Overview  The coronavirus disease (COVID-19) is caused by a virus. Symptoms may include a fever, a cough, and shortness of breath. It mainly spreads person-to-person through droplets from coughing and sneezing. The virus also can spread when people are in close contact with someone who is infected. Most people have mild symptoms and can take care of themselves at home. If their symptoms get worse, they may need care in a hospital. Treatment may include medicines to reduce symptoms, plus breathing support such as oxygen therapy or a ventilator. It's important to not spread the virus to others. If you have COVID-19, wear a face cover anytime you are around other people. You need to isolate yourself while you are sick. Leave your home only if you need to get medical care or testing. Follow-up care is a key part of your treatment and safety. Be sure to make and go to all appointments, and call your doctor if you are having problems. It's also a good idea to know your test results and keep a list of the medicines you take. How can you care for yourself at home? · Get extra rest. It can help you feel better. · Drink plenty of fluids. This helps replace fluids lost from fever. Fluids also help ease a scratchy throat. Water, soup, fruit juice, and hot tea with lemon are good choices. · Take acetaminophen (such as Tylenol) to reduce a fever. It may also help with muscle aches. Read and follow all instructions on the label. · Use petroleum jelly on sore skin. This can help if the skin around your nose and lips becomes sore from rubbing a lot with tissues. Tips for self-isolation  · Limit contact with people in your home.  If possible, stay in a separate bedroom and use a separate bathroom. · Wear a cloth face cover when you are around other people. It can help stop the spread of the virus when you cough or sneeze. · If you have to leave home, avoid crowds and try to stay at least 6 feet away from other people. · Avoid contact with pets and other animals. · Cover your mouth and nose with a tissue when you cough or sneeze. Then throw it in the trash right away. · Wash your hands often, especially after you cough or sneeze. Use soap and water, and scrub for at least 20 seconds. If soap and water aren't available, use an alcohol-based hand . · Don't share personal household items. These include bedding, towels, cups and glasses, and eating utensils. · [address] in the warmest water allowed for the fabric type, and dry it completely. It's okay to wash other people's laundry with yours. · Clean and disinfect your home every day. Use household  and disinfectant wipes or sprays. Take special care to clean things that you grab with your hands. These include doorknobs, remote controls, phones, and handles on your refrigerator and microwave. And don't forget countertops, tabletops, bathrooms, and computer keyboards. When you can end self-isolation  · If you know or suspect that you have COVID-19, stay in self-isolation until:  ? You haven't had a fever for 24 hours while not taking medicines to lower the fever, and  ? Your symptoms have improved, and  ? It's been at least 10 days since your symptoms started. · Talk to your doctor about whether you also need testing, especially if you have a weakened immune system. When should you call for help? Call 911 anytime you think you may need emergency care. For example, call if you have life-threatening symptoms, such as:    · You have severe trouble breathing.  (You can't talk at all.)     · You have constant chest pain or pressure.     · You are severely dizzy or lightheaded.     · You are confused or can't think clearly.     · Your face and lips have a blue color.     · You pass out (lose consciousness) or are very hard to wake up. Call your doctor now or seek immediate medical care if:    · You have moderate trouble breathing. (You can't speak a full sentence.)     · You are coughing up blood (more than about 1 teaspoon).     · You have signs of low blood pressure. These include feeling lightheaded; being too weak to stand; and having cold, pale, clammy skin. Watch closely for changes in your health, and be sure to contact your doctor if:    · Your symptoms get worse.     · You are not getting better as expected. Call before you go to the doctor's office. Follow their instructions. And wear a cloth face cover. Current as of: December 18, 2020               Content Version: 12.8  © 2006-2021 [employer]. Care instructions adapted under license by [employer]. If you have questions about a medical condition or this instruction, always ask your healthcare professional. [place] [address] any warranty or liability for your use of this information.

## 2021-03-31 NOTE — PROGRESS NOTES
Chief Complaint:   Fever (Body aches, Headache , Diarrhea ,Chills, Sinus congestion,  started on Saturday )      History of Present Illness   Source of history provided by:  patient. Mauro Flores is a 52 y.o. old male with a past medical history of:   Past Medical History:   Diagnosis Date    Acid reflux     Lea esophagus     H/O cardiovascular stress test 02/16/2021    Exercise Nuclear Treadmill    Hiatal hernia     Hyperlipidemia     Hypertension    Presents to the walk in clinic for evaluation of nasal congestion, headaches, diarrhea and chills onset over the last 4 days. Has been taking Dayquil OTC without relief. He has had a fever up to 101, relieved with fever reducers. He reports he did have COVID in January 2021 and did have Chest pain in February 2021 and was diagnosed with PE in Early March 2021 and is taking Eliquis BID and has not missed any doses. 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 He has been tolerating fluids well and has had no vomiting. He reports no chest pain or SOB. He denies any extreme fatigue or lethargy. He has not been taking any Vitamins at the current time. He lives with his wife and 16yr old son who are well and not ill. He does not smoke tobacco.  No any history of international travel in the past 14 days. No known  contact with any individuals with known COVID-19 infection or under investigation for COVID-19 infection.      ROS    Unless otherwise stated in this report or unable to obtain because of the patient's clinical or mental status as evidenced by the medical record, this patients's positive and negative responses for Review of Systems, constitutional, psych, eyes, ENT, cardiovascular, respiratory, gastrointestinal, neurological, genitourinary, musculoskeletal, integument systems and systems related to the presenting problem are either stated in the preceding or were not pertinent or were negative for the symptoms and/or complaints related to the medical problem. Past Surgical History:  has a past surgical history that includes sinus surgery; Colonoscopy (10/2017); and Upper gastrointestinal endoscopy (11/2019). Social History:  reports that he has never smoked. He has never used smokeless tobacco. He reports current alcohol use of about 1.0 standard drinks of alcohol per week. He reports that he does not use drugs. Family History: family history includes Esophageal Cancer in his father; Graves Disease in his mother. Allergies: Levaquin [levofloxacin]    Physical Exam         VS:  /78 (Site: Left Upper Arm, Position: Sitting, Cuff Size: Large Adult)   Pulse 91   Temp 99.4 °F (37.4 °C)   Resp 17   Ht 5' 9\" (1.753 m)   Wt 219 lb (99.3 kg)   SpO2 97%   BMI 32.34 kg/m²    Oxygen Saturation Interpretation: Normal.    Constitutional:  Alert, development consistent with age. Non toxic. Head: No significant  TTP over the maxillary or frontal sinuses bilaterally  Ears:  External Ears: Bilateral pinna normal. TMs scarred bilaterally,  without erythema or perforation bilaterally. Canals normal bilaterally without swelling or exudate  Nose:  Some thick yellow tinged congestion of the nasal mucosa. There is some mild injection to middle turbinates bilaterally. Throat: Some mild posterior pharyngeal erythema with mild post nasal drip present. Mucosa moist.   Neck:  Supple. There is some shotty anterior cervical adenopathy. No rigidity and no stridor. Lungs: Clear to ausculation bilaterally, no rales, or rhonchi  Heart:  Regular rate and rhythm, normal heart sounds, no murmur or ectopy. Skin:  Normal turgor. Warm, dry, without visible rash. Neurological:  Alert and oriented. Motor functions intact. Responds to verbal commands.      Lab / Imaging Results   (All laboratory and radiology results have been personally reviewed by myself)  Labs:  Results for orders placed or performed in visit on 03/31/21   POCT COVID-19, Antigen   Result Value Ref Range    SARS-COV-2, POC Detected (A) Not Detected    Lot Number [id number]     QC Pass/Fail pass     Performing Instrument BD Veritor          Assessment / Plan     Impression(s):  Zee Jeffries was seen today for fever. Diagnoses and all orders for this visit:    COVID-19 virus detected  -     azithromycin (ZITHROMAX) 250 MG tablet; 500mg on day 1 followed by 250mg on days 2 - 5  -     predniSONE (DELTASONE) 10 MG tablet; 4 tabs QD for 2 days, then 3 tabs QD for 2 days, then 2 tabs QD for 2 days, then 1 tab QD for 2 days then 1/2 tab QD for 2 days. Fever, unspecified fever cause  -     POCT COVID-19, Antigen  -     azithromycin (ZITHROMAX) 250 MG tablet; 500mg on day 1 followed by 250mg on days 2 - 5  -     predniSONE (DELTASONE) 10 MG tablet; 4 tabs QD for 2 days, then 3 tabs QD for 2 days, then 2 tabs QD for 2 days, then 1 tab QD for 2 days then 1/2 tab QD for 2 days. Body aches  -     POCT COVID-19, Antigen  -     predniSONE (DELTASONE) 10 MG tablet; 4 tabs QD for 2 days, then 3 tabs QD for 2 days, then 2 tabs QD for 2 days, then 1 tab QD for 2 days then 1/2 tab QD for 2 days. Diarrhea, unspecified type      Disposition:  Disposition: Discharge to home  Patient rapid COVID done by MA in office and positive. With patient current symptoms, would consider this new? ? Vs still positive from previous COVID infection? ?    My MA Ralph Urbina spoke to Dr. Alfredo Hoffman and he was concerned with patient and wanted patient to go to ER . I spoke with patient at length. The patient current vitals and pulse ox stable. The patient did not wish to go to ER. His oral temp normal, his pulse ox and BP stable. He is tolerating oral fluids. His BP is stable. He is non toxic in appearance. I offered him 2 view CXR and he declined. Patient given Rx for Zithromax and Prednisone, discussed him getting pulse ox to monitor at home.  Recommend low threshold for him to seek emergent care to ER as recommended by his PCP as well as close contact as well as follow up with his PCP via virtual visit if needed. Patient is current working from home. Recommend Quarantine for 10 days from onset of symptoms. Increase fluids and rest. Symptomatic relief discussed. F/u PCP as noted above and to ED sooner if symptoms worsen or change. Red flag symptoms discussed. Pt is in agreement with this care plan. All questions answered. Rigo Ibanez PA-C    This visit was provided as a focused evaluation during the COVID -19 pandemic/national emergency. A comprehensive review of all previous patient history and testing was not conducted. Pertinent findings were elicited during the visit.

## 2021-04-07 ENCOUNTER — TELEPHONE (OUTPATIENT)
Dept: FAMILY MEDICINE CLINIC | Age: 51
End: 2021-04-07

## 2021-04-07 RX ORDER — DOXYCYCLINE HYCLATE 100 MG
100 TABLET ORAL 2 TIMES DAILY
Qty: 20 TABLET | Refills: 0 | Status: SHIPPED | OUTPATIENT
Start: 2021-04-07 | End: 2021-04-17

## 2021-04-07 RX ORDER — DEXTROMETHORPHAN HYDROBROMIDE AND PROMETHAZINE HYDROCHLORIDE 15; 6.25 MG/5ML; MG/5ML
5 SYRUP ORAL 3 TIMES DAILY PRN
Qty: 150 ML | Refills: 0 | Status: SHIPPED | OUTPATIENT
Start: 2021-04-07 | End: 2021-04-14

## 2021-04-07 NOTE — TELEPHONE ENCOUNTER
Spoke to patient he was feeling better with the Z-Matthew and prednisone he has a dry cough, no fever, no nausea vomiting diarrhea, oxygen saturation 98% room air he has a dry cough  Prescription for doxycycline and Promethazine DM call  ER if worse  Office visit if no better

## 2021-04-07 NOTE — TELEPHONE ENCOUNTER
Patient called stating he was seen in the [facility] last week, prescribed medication and had been feeling better, however, he has developed a cough that won't go away. Patient is asking if Dr. Nikita Pappas would send RX. Please advise.     Last seen 3/15/2021  Next appt 6/16/2021  [facility]

## 2021-04-13 ENCOUNTER — TELEPHONE (OUTPATIENT)
Dept: FAMILY MEDICINE CLINIC | Age: 51
End: 2021-04-13

## 2021-04-13 DIAGNOSIS — I26.99 OTHER ACUTE PULMONARY EMBOLISM, UNSPECIFIED WHETHER ACUTE COR PULMONALE PRESENT (HCC): Primary | ICD-10-CM

## 2021-04-13 NOTE — TELEPHONE ENCOUNTER
Nafisa[facility] called regarding referral which has PE (physical exam) as diagnosis. Upon reviewing patient's chart, Mirza Vega stated she noted patient has a Pulmonary Embolism and is asking if that is what the diagnosis should be.   Mirza Vega stated if so, please change referral.

## 2021-04-26 ENCOUNTER — OFFICE VISIT (OUTPATIENT)
Dept: ONCOLOGY | Age: 51
End: 2021-04-26
Payer: COMMERCIAL

## 2021-04-26 ENCOUNTER — HOSPITAL ENCOUNTER (OUTPATIENT)
Dept: INFUSION THERAPY | Age: 51
End: 2021-04-26
Payer: COMMERCIAL

## 2021-04-26 DIAGNOSIS — I26.99 ACUTE PULMONARY EMBOLISM WITHOUT ACUTE COR PULMONALE, UNSPECIFIED PULMONARY EMBOLISM TYPE (HCC): Primary | ICD-10-CM

## 2021-04-26 NOTE — PROGRESS NOTES
obesity due to excess calories without serious comorbidity with body mass index (BMI) of 32.0 to 32.9 in adult     Chest pain 02/15/2021    Hypertension     Hyperlipidemia     Hiatal hernia     Lea esophagus     Acid reflux         Past Surgical History:   Procedure Laterality Date    COLONOSCOPY  10/2017   Meza SINUS SURGERY          UPPER GASTROINTESTINAL ENDOSCOPY  2019       Family History  Family History   Problem Relation Age of Onset    Graves Disease Mother     Esophageal Cancer Father          age 47       Social History  TOBACCO:   reports that he has never smoked. He has never used smokeless tobacco.  ETOH:   reports current alcohol use of about 1.0 standard drinks of alcohol per week. Home Medications  Prior to Admission medications    Medication Sig Start Date End Date Taking? Authorizing Provider   azithromycin (ZITHROMAX) 250 MG tablet 500mg on day 1 followed by 250mg on days 2 - 5 3/31/21   Margret Higuera PA-C   predniSONE (DELTASONE) 10 MG tablet 4 tabs QD for 2 days, then 3 tabs QD for 2 days, then 2 tabs QD for 2 days, then 1 tab QD for 2 days then 1/2 tab QD for 2 days.  3/31/21   Margret Higuera PA-C   apixaban (ELIQUIS) 5 MG TABS tablet Take 1 tablet by mouth 2 times daily 3/15/21   Pravin Junior MD   aspirin 81 MG chewable tablet Take 1 tablet by mouth daily 3/6/21   Augie Weinstein MD   apixaban starter pack (ELIQUIS) 5 MG TBPK tablet Take 1 tablet by mouth See Admin Instructions 10 mg twice a day for first 7 days then 5 mg twice a day 3/5/21   Augie Weinstein MD   propranolol (INDERAL) 10 MG tablet Take 1 tablet by mouth 3 times daily  Patient taking differently: Take 30 mg by mouth daily  2/15/21   Pravin Junior MD   pravastatin (PRAVACHOL) 20 MG tablet Take 1 tablet by mouth daily 2/15/21   Pravin Junior MD   Multiple Vitamins-Minerals (THERAPEUTIC MULTIVITAMIN-MINERALS) tablet Take 1 tablet by mouth daily    Historical Provider, MD thiamine mononitrate (GNP VITAMIN B-1) 100 MG tablet Take 100 mg by mouth daily    Historical Provider, MD   Omega-3 Fatty Acids (FISH OIL) 500 MG CAPS Take 500 mg by mouth daily    Historical Provider, MD   dexlansoprazole (DEXILANT) 60 MG CPDR capsule Take 60 mg by mouth daily. Historical Provider, MD       Allergies  Allergies   Allergen Reactions    Levaquin [Levofloxacin] Other (See Comments)     joint pain       Review of Systems:    Constitutional:  No fever chills or rigors. Eyes: No changes in vision, discharge, or pain  ENT: No Headaches, hearing loss or vertigo. No mouth sores or sore throat. No change in taste or smell. Cardiovascular: No chest discomfort, dyspnea on exertion, palpitations or loss of consciousness. or phlebitis. Respiratory: Has no cough or wheezing, Has no sputum production. Has no hemoptysis, Has no pleuritic pain, . Gastrointestinal: No abdominal pain, appetite loss, blood in stools. No change in bowel habits. No hematemesis   Genitourinary: Patient acknowledges no dysuria, trouble voiding, or hematuria. No nocturia or increased frequency. Musculoskeletal: No gait disturbance, weakness or joint complaints. Integumentary: No rash or pruritis. Neurological: No headache, diplopia, change in muscle strength, numbness or tingling. No change in gait, balance, coordination, mood, affect, memory, mentation, behavior. Psychiatric: No anxiety, or depression. Endocrine: No temperature intolerance. No excessive thirst, fluid intake, or urination. No tremor. Hematologic/Lymphatic: No abnormal bruising or bleeding, blood clots or swollen lymph nodes. Allergic/Immunologic: No nasal congestion or hives. Objective  There were no vitals taken for this visit. Physical Exam: Performance Status: 0  General: AAO to person, place, time, in  no acute distress. Head and neck: PERRLA, EOMI . Sclera non icteric. Oropharynx: Clear. Neck: no JVD,  no adenopathy, no carotid bruit. Heart: Regular rate and regular rhythm, no murmur. Lungs: Clear to auscultation. Abdomen: Soft, non-tender;no masses, no organomegaly. Extremities: No edema,no cyanosis, no clubbing. Neurologic:Cranial nerves grossly intact. No focal motor or sensory deficits. Skin:  No rash. Recent Laboratory Data-   Lab Results   Component Value Date    WBC 5.3 03/06/2021    HGB 15.2 03/06/2021    HCT 44.6 03/06/2021    MCV 89.9 03/06/2021     03/06/2021    LYMPHOPCT 27.3 03/04/2021    RBC 4.96 03/06/2021    MCH 30.6 03/06/2021    MCHC 34.1 03/06/2021    RDW 13.0 03/06/2021    NEUTOPHILPCT 57.2 03/04/2021    MONOPCT 11.8 03/04/2021    BASOPCT 1.2 03/04/2021    NEUTROABS 3.39 03/04/2021    LYMPHSABS 1.62 03/04/2021    MONOSABS 0.70 03/04/2021    EOSABS 0.13 03/04/2021    BASOSABS 0.07 03/04/2021       Lab Results   Component Value Date     03/03/2021    K 4.4 03/03/2021     03/03/2021    CO2 28 03/03/2021    BUN 13 03/03/2021    CREATININE 1.1 03/03/2021    GLUCOSE 99 03/03/2021    CALCIUM 9.7 03/03/2021    PROT 7.5 02/15/2021    LABALBU 4.5 02/15/2021    BILITOT 0.4 02/15/2021    ALKPHOS 62 02/15/2021    AST 31 02/15/2021    ALT 35 02/15/2021    LABGLOM >60 03/03/2021    GFRAA >60 03/03/2021       No results found for: IRON, TIBC, [address] [facility]        Radiology-  No results found. ASSESSMENT/PLAN :    Jamal Duncan was seen today for new patient and other. Diagnoses and all orders for this visit:    Acute pulmonary embolism without acute cor pulmonale, unspecified pulmonary embolism type ([address]            Will Moreno. Aydin Nair M.D., F.A.C.P.   Electronically signed 4/26/2021 at 7:22 AM

## 2021-05-05 ENCOUNTER — OFFICE VISIT (OUTPATIENT)
Dept: ONCOLOGY | Age: 51
End: 2021-05-05
Payer: COMMERCIAL

## 2021-05-05 ENCOUNTER — HOSPITAL ENCOUNTER (OUTPATIENT)
Dept: INFUSION THERAPY | Age: 51
Discharge: HOME | End: 2021-05-05
Payer: COMMERCIAL

## 2021-05-05 VITALS
HEART RATE: 87 BPM | WEIGHT: 222.1 LBS | DIASTOLIC BLOOD PRESSURE: 88 MMHG | SYSTOLIC BLOOD PRESSURE: 155 MMHG | OXYGEN SATURATION: 94 % | BODY MASS INDEX: 32.8 KG/M2 | TEMPERATURE: 97.5 F

## 2021-05-05 DIAGNOSIS — I26.99 ACUTE PULMONARY EMBOLISM WITHOUT ACUTE COR PULMONALE, UNSPECIFIED PULMONARY EMBOLISM TYPE (HCC): Primary | ICD-10-CM

## 2021-05-05 DIAGNOSIS — I26.99 ACUTE PULMONARY EMBOLISM WITHOUT ACUTE COR PULMONALE, UNSPECIFIED PULMONARY EMBOLISM TYPE (HCC): ICD-10-CM

## 2021-05-05 LAB
ALBUMIN SERPL-MCNC: 4.2 G/DL (ref 3.5–5.2)
ALP BLD-CCNC: 61 U/L (ref 40–129)
ALT SERPL-CCNC: 29 U/L (ref 0–40)
ANION GAP SERPL CALCULATED.3IONS-SCNC: 10 MMOL/L (ref 7–16)
AST SERPL-CCNC: 22 U/L (ref 0–39)
BASOPHILS ABSOLUTE: 0.09 E9/L (ref 0–0.2)
BASOPHILS RELATIVE PERCENT: 1.7 % (ref 0–2)
BILIRUB SERPL-MCNC: 0.4 MG/DL (ref 0–1.2)
BUN BLDV-MCNC: 15 MG/DL (ref 6–20)
CALCIUM SERPL-MCNC: 9.3 MG/DL (ref 8.6–10.2)
CHLORIDE BLD-SCNC: 103 MMOL/L (ref 98–107)
CO2: 28 MMOL/L (ref 22–29)
CREAT SERPL-MCNC: 1.1 MG/DL (ref 0.7–1.2)
D DIMER: <200 NG/ML DDU
EOSINOPHILS ABSOLUTE: 0.16 E9/L (ref 0.05–0.5)
EOSINOPHILS RELATIVE PERCENT: 3 % (ref 0–6)
GFR AFRICAN AMERICAN: >60
GFR NON-AFRICAN AMERICAN: >60 ML/MIN/1.73
GLUCOSE BLD-MCNC: 111 MG/DL (ref 74–99)
HCT VFR BLD CALC: 43.4 % (ref 37–54)
HEMOGLOBIN: 15.2 G/DL (ref 12.5–16.5)
IMMATURE GRANULOCYTES #: 0.02 E9/L
IMMATURE GRANULOCYTES %: 0.4 % (ref 0–5)
LYMPHOCYTES ABSOLUTE: 1.37 E9/L (ref 1.5–4)
LYMPHOCYTES RELATIVE PERCENT: 25.6 % (ref 20–42)
MCH RBC QN AUTO: 31 PG (ref 26–35)
MCHC RBC AUTO-ENTMCNC: 35 % (ref 32–34.5)
MCV RBC AUTO: 88.6 FL (ref 80–99.9)
MONOCYTES ABSOLUTE: 0.69 E9/L (ref 0.1–0.95)
MONOCYTES RELATIVE PERCENT: 12.9 % (ref 2–12)
NEUTROPHILS ABSOLUTE: 3.03 E9/L (ref 1.8–7.3)
NEUTROPHILS RELATIVE PERCENT: 56.4 % (ref 43–80)
PDW BLD-RTO: 13.1 FL (ref 11.5–15)
PLATELET # BLD: 277 E9/L (ref 130–450)
PMV BLD AUTO: 9.7 FL (ref 7–12)
POTASSIUM SERPL-SCNC: 4.5 MMOL/L (ref 3.5–5)
RBC # BLD: 4.9 E12/L (ref 3.8–5.8)
SODIUM BLD-SCNC: 141 MMOL/L (ref 132–146)
TOTAL PROTEIN: 7.4 G/DL (ref 6.4–8.3)
WBC # BLD: 5.4 E9/L (ref 4.5–11.5)

## 2021-05-05 PROCEDURE — 81240 F2 GENE: CPT

## 2021-05-05 PROCEDURE — 80053 COMPREHEN METABOLIC PANEL: CPT

## 2021-05-05 PROCEDURE — 81400 MOPATH PROCEDURE LEVEL 1: CPT

## 2021-05-05 PROCEDURE — 85378 FIBRIN DEGRADE SEMIQUANT: CPT

## 2021-05-05 PROCEDURE — 99214 OFFICE O/P EST MOD 30 MIN: CPT

## 2021-05-05 PROCEDURE — 81291 MTHFR GENE: CPT

## 2021-05-05 PROCEDURE — 85025 COMPLETE CBC W/AUTO DIFF WBC: CPT

## 2021-05-05 PROCEDURE — 81241 F5 GENE: CPT

## 2021-05-05 PROCEDURE — 36415 COLL VENOUS BLD VENIPUNCTURE: CPT

## 2021-05-05 PROCEDURE — 83516 IMMUNOASSAY NONANTIBODY: CPT

## 2021-05-05 NOTE — PROGRESS NOTES
[address]  [address]   Hematology/Oncology  Consult      Patient Name: Zaynab Olsen  YOB: 1970  PCP: Jason Calderon MD   Referring Provider:      Reason for Consultation:   Chief Complaint   Patient presents with    New Patient     Acute pulmonary embolism          History of Present Illness: This patient was referred to [facility] by Dr. Katie Winkler for evaluation and treatment of acute pulmonary emboli  This patient came to [facility]'s ER on  March 3, 2021 with complaints of pain in the right upper arm. He had had an IV in his right antecubital area 3 to 4 weeks prior and has mild pain ever since then. He was seen in urgent care and diagnosed with superficial thrombophlebitis   They called his PCP, Dr. Katie Winkler, who ordered a CT of his chest:  which revealed \"a pulmonary emboli in the right lower lobe and left upper lobe\". He was admitted to telemetry at [address] [place] [address]. He was treated with a heparin drip and then converted to Eliquis. He was discharged to home on March 6, 2021 with a prescription for a starter pack for Eliquis. He presented to  the walk-in clinic at [facility]) on March 31, 2021 with complaints of nasal congestion, headache, diarrhea and chills for the past 4 days. He had a fever of approximately 101. He did have a history of Covid in January 2021. A COVID-19 test was positive he was put on Zithromax and prednisone.             Diagnostic Data:     Past Medical History:   Diagnosis Date    Acid reflux     Lea esophagus     H/O cardiovascular stress test 02/16/2021    Exercise Nuclear Treadmill    Hiatal hernia     Hyperlipidemia     Hypertension        Patient Active Problem List    Diagnosis Date Noted    Bilateral pulmonary embolism ([address] 03/03/2021    Pulmonary embolism, bilateral ([facility] [facility] 03/03/2021    Class 1 obesity due to excess calories without serious comorbidity with body mass index (BMI) of 32.0 to 32.9 in adult     Chest pain 02/15/2021    Hypertension     Hyperlipidemia     Hiatal hernia     Lea esophagus     Acid reflux         Past Surgical History:   Procedure Laterality Date    COLONOSCOPY  10/2017   Francesca Mons SINUS SURGERY          UPPER GASTROINTESTINAL ENDOSCOPY  2019       Family History  Family History   Problem Relation Age of Onset    Graves Disease Mother     Esophageal Cancer Father          age 47       Social History  TOBACCO:   reports that he has never smoked. He has never used smokeless tobacco.  ETOH:   reports current alcohol use of about 1.0 standard drinks of alcohol per week. Home Medications  Prior to Admission medications    Medication Sig Start Date End Date Taking? Authorizing Provider   azithromycin (ZITHROMAX) 250 MG tablet 500mg on day 1 followed by 250mg on days 2 - 5 3/31/21   Darrion Luke PA-C   predniSONE (DELTASONE) 10 MG tablet 4 tabs QD for 2 days, then 3 tabs QD for 2 days, then 2 tabs QD for 2 days, then 1 tab QD for 2 days then 1/2 tab QD for 2 days.  3/31/21   Darrion Luke PA-C   apixaban (ELIQUIS) 5 MG TABS tablet Take 1 tablet by mouth 2 times daily 3/15/21   Gerry Bergeron MD   aspirin 81 MG chewable tablet Take 1 tablet by mouth daily 3/6/21   Sohail Zavala MD   apixaban starter pack (ELIQUIS) 5 MG TBPK tablet Take 1 tablet by mouth See Admin Instructions 10 mg twice a day for first 7 days then 5 mg twice a day 3/5/21   Sohail Zavala MD   propranolol (INDERAL) 10 MG tablet Take 1 tablet by mouth 3 times daily  Patient taking differently: Take 30 mg by mouth daily  2/15/21   Gerry Bergeron MD   pravastatin (PRAVACHOL) 20 MG tablet Take 1 tablet by mouth daily 2/15/21   Gerry Bergeron MD   Multiple Vitamins-Minerals (THERAPEUTIC MULTIVITAMIN-MINERALS) tablet Take 1 tablet by mouth daily    Historical Provider, MD   thiamine mononitrate (GNP VITAMIN B-1) 100 MG tablet Take 100 mg by mouth daily Historical Provider, MD   Omega-3 Fatty Acids (FISH OIL) 500 MG CAPS Take 500 mg by mouth daily    Historical Provider, MD   dexlansoprazole (DEXILANT) 60 MG CPDR capsule Take 60 mg by mouth daily. Historical Provider, MD       Allergies  Allergies   Allergen Reactions    Levaquin [Levofloxacin] Other (See Comments)     joint pain       Review of Systems: Negative and noncontributory. His dyspnea arm swelling and pain have all resolved    Constitutional:  No fever chills or rigors. Eyes: No changes in vision, discharge, or pain  ENT: No Headaches, hearing loss or vertigo. No mouth sores or sore throat. No change in taste or smell. Cardiovascular: No chest discomfort, dyspnea on exertion, palpitations or loss of consciousness. or phlebitis. Respiratory: Has no cough or wheezing, Has no sputum production. Has no hemoptysis, Has no pleuritic pain, . Gastrointestinal: No abdominal pain, appetite loss, blood in stools. No change in bowel habits. No hematemesis   Genitourinary: Patient acknowledges no dysuria, trouble voiding, or hematuria. No nocturia or increased frequency. Musculoskeletal: No gait disturbance, weakness or joint complaints. Integumentary: No rash or pruritis. Neurological: No headache, diplopia, change in muscle strength, numbness or tingling. No change in gait, balance, coordination, mood, affect, memory, mentation, behavior. Psychiatric: No anxiety, or depression. Endocrine: No temperature intolerance. No excessive thirst, fluid intake, or urination. No tremor. Hematologic/Lymphatic: No abnormal bruising or bleeding, blood clots or swollen lymph nodes. Allergic/Immunologic: No nasal congestion or hives. Objective  BP (!) 124/90   Pulse 87   Temp 97.5 °F (36.4 °C)   Wt 222 lb 1.6 oz (100.7 kg)   SpO2 94%   BMI 32.80 kg/m²   Physical Exam:   General: AAO to person, place, time, in  no acute distress. Head and neck: PERRLA, EOMI . Sclera non icteric.   Oropharynx: Clear.  Neck: no JVD,  no adenopathy, no carotid bruit. Heart: Regular rate and regular rhythm, no murmur. Lungs: Clear to auscultation. Abdomen: Soft, non-tender;no masses, no organomegaly. Extremities: No edema,no cyanosis, no clubbing. Neurologic:Cranial nerves grossly intact. No focal motor or sensory deficits. Skin:  No rash. Recent Laboratory Data-   Lab Results   Component Value Date    WBC 5.3 03/06/2021    HGB 15.2 03/06/2021    HCT 44.6 03/06/2021    MCV 89.9 03/06/2021     03/06/2021    LYMPHOPCT 27.3 03/04/2021    RBC 4.96 03/06/2021    MCH 30.6 03/06/2021    MCHC 34.1 03/06/2021    RDW 13.0 03/06/2021    NEUTOPHILPCT 57.2 03/04/2021    MONOPCT 11.8 03/04/2021    BASOPCT 1.2 03/04/2021    NEUTROABS 3.39 03/04/2021    LYMPHSABS 1.62 03/04/2021    MONOSABS 0.70 03/04/2021    EOSABS 0.13 03/04/2021    BASOSABS 0.07 03/04/2021       Lab Results   Component Value Date     03/03/2021    K 4.4 03/03/2021     03/03/2021    CO2 28 03/03/2021    BUN 13 03/03/2021    CREATININE 1.1 03/03/2021    GLUCOSE 99 03/03/2021    CALCIUM 9.7 03/03/2021    PROT 7.5 02/15/2021    LABALBU 4.5 02/15/2021    BILITOT 0.4 02/15/2021    ALKPHOS 62 02/15/2021    AST 31 02/15/2021    ALT 35 02/15/2021    LABGLOM >60 03/03/2021    GFRAA >60 03/03/2021       No results found for: IRON, TIBC, [facility]        Radiology-  No results found. ASSESSMENT/PLAN : 74-year-old man  Hypertension  Hyperlipidemia  GERD  Covid pneumonia in January 2021  Acute bilateral PE in March 2021    No prior history of clotting disorder and no family history of thrombophilia. It was explained to him that his bilateral pulmonary emboli were likely provoked by Covid. Thrombophilia panel and antiphospholipid antibody titers as well as D-dimer will be done today. When his work-up is completed he is likely to be recommended 1 full year of anticoagulation with Eliquis. He will be recommended to receive the Covid vaccine. He has been reassured                Mikey Ramirez. Keegan Rubin M.D., F.A.C.P.   Electronically signed 5/5/2021 at 7:28 AM

## 2021-05-06 ENCOUNTER — TELEPHONE (OUTPATIENT)
Dept: FAMILY MEDICINE CLINIC | Age: 51
End: 2021-05-06

## 2021-05-13 LAB — THROMBOPHILIA DNA ASSAY: NORMAL

## 2021-06-08 NOTE — PROGRESS NOTES
Medication Sig Start Date End Date Taking? Authorizing Provider   azithromycin (ZITHROMAX) 250 MG tablet 500mg on day 1 followed by 250mg on days 2 - 5 3/31/21   Marlin Miller PA-C   predniSONE (DELTASONE) 10 MG tablet 4 tabs QD for 2 days, then 3 tabs QD for 2 days, then 2 tabs QD for 2 days, then 1 tab QD for 2 days then 1/2 tab QD for 2 days. 3/31/21   Marlin Miller PA-C   apixaban (ELIQUIS) 5 MG TABS tablet Take 1 tablet by mouth 2 times daily 3/15/21   Weston Monroe MD   aspirin 81 MG chewable tablet Take 1 tablet by mouth daily 3/6/21   Curtis Rdz MD   apixaban starter pack (ELIQUIS) 5 MG TBPK tablet Take 1 tablet by mouth See Admin Instructions 10 mg twice a day for first 7 days then 5 mg twice a day 3/5/21   Curtis Rdz MD   propranolol (INDERAL) 10 MG tablet Take 1 tablet by mouth 3 times daily  Patient taking differently: Take 30 mg by mouth daily  2/15/21   Weston Monroe MD   pravastatin (PRAVACHOL) 20 MG tablet Take 1 tablet by mouth daily 2/15/21   Weston Monroe MD   Multiple Vitamins-Minerals (THERAPEUTIC MULTIVITAMIN-MINERALS) tablet Take 1 tablet by mouth daily    Historical Provider, MD   thiamine mononitrate (GNP VITAMIN B-1) 100 MG tablet Take 100 mg by mouth daily    Historical Provider, MD   Omega-3 Fatty Acids (FISH OIL) 500 MG CAPS Take 500 mg by mouth daily    Historical Provider, MD   dexlansoprazole (DEXILANT) 60 MG CPDR capsule Take 60 mg by mouth daily.       Historical Provider, MD    Scheduled Meds:  Continuous Infusions:  PRN Meds:.        Recent Laboratory Data-     Lab Results   Component Value Date    WBC 5.4 05/05/2021    HGB 15.2 05/05/2021    HCT 43.4 05/05/2021    MCV 88.6 05/05/2021     05/05/2021    LYMPHOPCT 25.6 05/05/2021    RBC 4.90 05/05/2021    MCH 31.0 05/05/2021    MCHC 35.0 (H) 05/05/2021    RDW 13.1 05/05/2021    NEUTOPHILPCT 56.4 05/05/2021    MONOPCT 12.9 (H) 05/05/2021    BASOPCT 1.7 05/05/2021    NEUTROABS 3.03 05/05/2021 LYMPHSABS 1.37 (L) 05/05/2021    MONOSABS 0.69 05/05/2021    EOSABS 0.16 05/05/2021    BASOSABS 0.09 05/05/2021       Lab Results   Component Value Date     05/05/2021    K 4.5 05/05/2021     05/05/2021    CO2 28 05/05/2021    BUN 15 05/05/2021    CREATININE 1.1 05/05/2021    GLUCOSE 111 (H) 05/05/2021    CALCIUM 9.3 05/05/2021    PROT 7.4 05/05/2021    LABALBU 4.2 05/05/2021    BILITOT 0.4 05/05/2021    ALKPHOS 61 05/05/2021    AST 22 05/05/2021    ALT 29 05/05/2021    LABGLOM >60 05/05/2021    GFRAA >60 05/05/2021         No results found for: IRON, TIBC, FERRITIN      No results found for:       No results found for: CEA          Radiology-  No results found. ASSESSMENT/PLAN:  A 56-year-old man with:    Hypertension  Hyperlipidemia  GERD  Covid pneumonia in January 2021  Acute bilateral PE in March 2021     No prior history of clotting disorder and no family history of thrombophilia.     It was explained to him that his bilateral pulmonary emboli were likely provoked by Covid. Thrombophilia panel and antiphospholipid antibody titers as well as D-dimer will be done today. When his work-up is completed he is likely to be recommended 1 full year of anticoagulation with Eliquis.        He will be recommended to receive the Covid vaccine. 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 It was explained to him that he is at high risk of venous thromboembolism and that Covid contributed to his worsening thrombophilia. He will be recommended folic acid supplements to optimize serum homocysteine level and decrease risk of premature atherosclerosis. Strict blood pressure control exercise and normal lipid profile were stressed.   He will be recommended 1 year of anticoagulation with Eliquis and in April 2022 he will have a repeat D-dimer and if normal his Eliquis will be discontinued and he will be switched to baby aspirin. He has been cleared to receive his COVID-19 vaccine with RNA vaccines either Pfizer or [employer] E. Veena Mijares M.D., F.A.C.P.   Electronically signed 6/8/2021 at 4:50 PM

## 2021-06-09 ENCOUNTER — OFFICE VISIT (OUTPATIENT)
Dept: ONCOLOGY | Age: 51
End: 2021-06-09
Payer: COMMERCIAL

## 2021-06-09 VITALS
HEART RATE: 88 BPM | DIASTOLIC BLOOD PRESSURE: 90 MMHG | WEIGHT: 217.3 LBS | HEIGHT: 69 IN | SYSTOLIC BLOOD PRESSURE: 129 MMHG | BODY MASS INDEX: 32.19 KG/M2 | OXYGEN SATURATION: 95 % | TEMPERATURE: 97.7 F

## 2021-06-09 DIAGNOSIS — I26.99 ACUTE PULMONARY EMBOLISM WITHOUT ACUTE COR PULMONALE, UNSPECIFIED PULMONARY EMBOLISM TYPE (HCC): Primary | ICD-10-CM

## 2021-06-09 PROCEDURE — 99212 OFFICE O/P EST SF 10 MIN: CPT

## 2021-06-09 RX ORDER — FOLIC ACID 1 MG/1
1 TABLET ORAL DAILY
Qty: 90 TABLET | Refills: 3 | Status: SHIPPED
Start: 2021-06-09 | End: 2022-04-11 | Stop reason: SDUPTHER

## 2021-06-16 ENCOUNTER — OFFICE VISIT (OUTPATIENT)
Dept: FAMILY MEDICINE CLINIC | Age: 51
End: 2021-06-16
Payer: COMMERCIAL

## 2021-06-16 VITALS
BODY MASS INDEX: 32.64 KG/M2 | HEART RATE: 78 BPM | OXYGEN SATURATION: 98 % | WEIGHT: 221 LBS | DIASTOLIC BLOOD PRESSURE: 78 MMHG | SYSTOLIC BLOOD PRESSURE: 138 MMHG

## 2021-06-16 DIAGNOSIS — G43.709 CHRONIC MIGRAINE WITHOUT AURA WITHOUT STATUS MIGRAINOSUS, NOT INTRACTABLE: ICD-10-CM

## 2021-06-16 DIAGNOSIS — E78.5 HYPERLIPIDEMIA, UNSPECIFIED HYPERLIPIDEMIA TYPE: ICD-10-CM

## 2021-06-16 DIAGNOSIS — K22.70 BARRETT'S ESOPHAGUS WITHOUT DYSPLASIA: ICD-10-CM

## 2021-06-16 DIAGNOSIS — I26.99 BILATERAL PULMONARY EMBOLISM (HCC): Primary | ICD-10-CM

## 2021-06-16 PROCEDURE — G8427 DOCREV CUR MEDS BY ELIG CLIN: HCPCS | Performed by: INTERNAL MEDICINE

## 2021-06-16 PROCEDURE — G8417 CALC BMI ABV UP PARAM F/U: HCPCS | Performed by: INTERNAL MEDICINE

## 2021-06-16 PROCEDURE — 99213 OFFICE O/P EST LOW 20 MIN: CPT | Performed by: INTERNAL MEDICINE

## 2021-06-16 PROCEDURE — 1036F TOBACCO NON-USER: CPT | Performed by: INTERNAL MEDICINE

## 2021-06-16 RX ORDER — PROPRANOLOL HYDROCHLORIDE 10 MG/1
TABLET ORAL
Qty: 90 TABLET | Refills: 3 | OUTPATIENT
Start: 2021-06-16

## 2021-06-16 SDOH — ECONOMIC STABILITY: FOOD INSECURITY: WITHIN THE PAST 12 MONTHS, YOU WORRIED THAT YOUR FOOD WOULD RUN OUT BEFORE YOU GOT MONEY TO BUY MORE.: NEVER TRUE

## 2021-06-16 SDOH — ECONOMIC STABILITY: FOOD INSECURITY: WITHIN THE PAST 12 MONTHS, THE FOOD YOU BOUGHT JUST DIDN'T LAST AND YOU DIDN'T HAVE MONEY TO GET MORE.: NEVER TRUE

## 2021-06-16 ASSESSMENT — SOCIAL DETERMINANTS OF HEALTH (SDOH): HOW HARD IS IT FOR YOU TO PAY FOR THE VERY BASICS LIKE FOOD, HOUSING, MEDICAL CARE, AND HEATING?: NOT HARD AT ALL

## 2021-06-16 NOTE — PROGRESS NOTES
Subjective:     Chief Complaint   Patient presents with    3 Month Follow-Up      Patient follow-up on the palm of the left  Patient had a COVID-19  And he was seen by me he was having shortness of breath  CTA chest positive for PE  He has been on Eliquis doing well  He was referred to hematologist they recommended to continue Eliquis for at least 1 year he had thrombophilia panel done  Denies any bleeding or bruising      His migraines are doing much better with Inderal    History of Lea's esophagus denies dysphagia taking Dexilant      Hyperlipidemia tolerating pravastatin  Past Medical History:   Diagnosis Date    Acid reflux     Lea esophagus     H/O cardiovascular stress test 02/16/2021    Exercise Nuclear Treadmill    Hiatal hernia     Hyperlipidemia     Hypertension         Social History     Socioeconomic History    Marital status:      Spouse name: Not on file    Number of children: Not on file    Years of education: Not on file    Highest education level: Not on file   Occupational History    Not on file   Tobacco Use    Smoking status: Never Smoker    Smokeless tobacco: Never Used   Vaping Use    Vaping Use: Never used   Substance and Sexual Activity    Alcohol use: Yes     Alcohol/week: 1.0 standard drinks     Types: 1 Cans of beer per week    Drug use: No    Sexual activity: Yes     Partners: Female   Other Topics Concern    Not on file   Social History Narrative    Not on file     Social Determinants of Health     Financial Resource Strain: Low Risk     Difficulty of Paying Living Expenses: Not hard at all   Food Insecurity: No Food Insecurity    Worried About [address] in the Last Year: Never true    [address]  in the Last Year: Never true   Transportation Needs:     Lack of Transportation (Medical):      Lack of Transportation (Non-Medical):    Physical Activity:     Days of Exercise per Week:     Minutes of Exercise per Session:    Stress:     Feeling of Stress :    Social Connections:     Frequency of Communication with Friends and Family:     Frequency of Social Gatherings with Friends and Family:     Attends Jehovah's witness Services:     Active Member of Clubs or Organizations:     Attends Club or Organization Meetings:     Marital Status:    Intimate Partner Violence:     Fear of Current or Ex-Partner:     Emotionally Abused:     Physically Abused:     Sexually Abused:         Past Surgical History:   Procedure Laterality Date    COLONOSCOPY  10/2017   [facility] SINUS SURGERY      2006    UPPER GASTROINTESTINAL ENDOSCOPY  2019        Family History   Problem Relation Age of Onset    Graves Disease Mother     Esophageal Cancer Father          age 47        Allergies   Allergen Reactions    Levaquin [Levofloxacin] Other (See Comments)     joint pain        ROS  No acute distress  Cardiac: Denies any chest pain or palpitation  Denies any chest pain or angina  Respiratory: Denies any cough or shortness of breath  Bilateral PE after COVID-19  GI: No abdominal pain. Denies any nausea vomiting or diarrhea  No blood in the stool  : Denies any dysuria frequency or hematuria  Neuro: No headache or dizziness  Endocrine: No diabetes  Skin: normal  No recent weight gain or weight loss  Denies any change in vision    Objective:    /78   Pulse 78   Wt 221 lb (100.2 kg)   SpO2 98%   BMI 32.64 kg/m²     Constitutional: Alert awake and oriented  Eyes: Pupils equal bilaterally. Extraocular muscles intact  Neck: no JVD adenopathy no bruit  Heart:  RRR, no murmurs, gallops, or rubs.   Lungs:    no wheeze, rales or rhonchi  Abd: bowel sounds present, nontender, nondistended, no masses  Extrem:  No clubbing, cyanosis, or edema  Neuro: AAOx3,No Focal deficit  Psychological: no depression or anxiety       Current Outpatient Medications   Medication Sig Dispense Refill    folic acid (FOLVITE) 1 MG tablet Take 1 tablet by mouth daily 90 tablet 3    apixaban (ELIQUIS) 5 MG TABS tablet Take 1 tablet by mouth 2 times daily 60 tablet 3    propranolol (INDERAL) 10 MG tablet Take 1 tablet by mouth 3 times daily (Patient taking differently: Take 30 mg by mouth daily ) 90 tablet 3    pravastatin (PRAVACHOL) 20 MG tablet Take 1 tablet by mouth daily 90 tablet 1    Multiple Vitamins-Minerals (THERAPEUTIC MULTIVITAMIN-MINERALS) tablet Take 1 tablet by mouth daily      thiamine mononitrate (GNP VITAMIN B-1) 100 MG tablet Take 100 mg by mouth daily      Omega-3 Fatty Acids (FISH OIL) 500 MG CAPS Take 500 mg by mouth daily      dexlansoprazole (DEXILANT) 60 MG CPDR capsule Take 60 mg by mouth daily. No current facility-administered medications for this visit.         Last 3 BMP  Lab Results   Component Value Date/Time     05/05/2021 03:11 PM     03/03/2021 04:19 PM     02/16/2021 05:04 AM    K 4.5 05/05/2021 03:11 PM    K 4.4 03/03/2021 04:19 PM    K 3.8 02/16/2021 05:04 AM    K 4.2 02/15/2021 10:37 AM    K 4.1 02/26/2020 12:00 AM    K 4.3 07/24/2019 12:00 AM    K 5.6 (H) 03/17/2017 10:23 AM     05/05/2021 03:11 PM     03/03/2021 04:19 PM     02/16/2021 05:04 AM    CO2 28 05/05/2021 03:11 PM    CO2 28 03/03/2021 04:19 PM    CO2 27 02/16/2021 05:04 AM    BUN 15 05/05/2021 03:11 PM    BUN 13 03/03/2021 04:19 PM    BUN 14 02/16/2021 05:04 AM    CREATININE 1.1 05/05/2021 03:11 PM    CREATININE 1.1 03/03/2021 04:19 PM    CREATININE 1.1 03/03/2021 02:43 PM    CREATININE 1.1 02/16/2021 05:04 AM    CREATININE 1.1 02/26/2020 12:00 AM    CREATININE 1.1 07/24/2019 12:00 AM    GLUCOSE 111 (H) 05/05/2021 03:11 PM    GLUCOSE 99 03/03/2021 04:19 PM    GLUCOSE 104 (H) 02/16/2021 05:04 AM    CALCIUM 9.3 05/05/2021 03:11 PM    CALCIUM 9.7 03/03/2021 04:19 PM    CALCIUM 9.0 02/16/2021 05:04 AM       Last 3 CMP:    Lab Results   Component Value Date/Time     05/05/2021 03:11 PM     03/03/2021 04:19 PM     02/16/2021 05:04 AM    K 4.5 05/05/2021 03:11 PM    K 4.4 03/03/2021 04:19 PM    K 3.8 02/16/2021 05:04 AM    K 4.2 02/15/2021 10:37 AM    K 4.1 02/26/2020 12:00 AM    K 4.3 07/24/2019 12:00 AM    K 5.6 (H) 03/17/2017 10:23 AM     05/05/2021 03:11 PM     03/03/2021 04:19 PM     02/16/2021 05:04 AM    CO2 28 05/05/2021 03:11 PM    CO2 28 03/03/2021 04:19 PM    CO2 27 02/16/2021 05:04 AM    BUN 15 05/05/2021 03:11 PM    BUN 13 03/03/2021 04:19 PM    BUN 14 02/16/2021 05:04 AM    CREATININE 1.1 05/05/2021 03:11 PM    CREATININE 1.1 03/03/2021 04:19 PM    CREATININE 1.1 03/03/2021 02:43 PM    CREATININE 1.1 02/16/2021 05:04 AM    CREATININE 1.1 02/26/2020 12:00 AM    CREATININE 1.1 07/24/2019 12:00 AM    GLUCOSE 111 (H) 05/05/2021 03:11 PM    GLUCOSE 99 03/03/2021 04:19 PM    GLUCOSE 104 (H) 02/16/2021 05:04 AM    CALCIUM 9.3 05/05/2021 03:11 PM    CALCIUM 9.7 03/03/2021 04:19 PM    CALCIUM 9.0 02/16/2021 05:04 AM    PROT 7.4 05/05/2021 03:11 PM    PROT 7.5 02/15/2021 10:37 AM    PROT 7.5 03/17/2017 10:23 AM    LABALBU 4.2 05/05/2021 03:11 PM    LABALBU 4.5 02/15/2021 10:37 AM    LABALBU 4.7 03/17/2017 10:23 AM    BILITOT 0.4 05/05/2021 03:11 PM    BILITOT 0.4 02/15/2021 10:37 AM    BILITOT 0.4 03/17/2017 10:23 AM    ALKPHOS 61 05/05/2021 03:11 PM    ALKPHOS 62 02/15/2021 10:37 AM    ALKPHOS 54 03/17/2017 10:23 AM    AST 22 05/05/2021 03:11 PM    AST 31 02/15/2021 10:37 AM    AST 19 03/17/2017 10:23 AM    ALT 29 05/05/2021 03:11 PM    ALT 35 02/15/2021 10:37 AM    ALT 27 03/17/2017 10:23 AM        CBC:   Lab Results   Component Value Date/Time    WBC 5.4 05/05/2021 03:11 PM    RBC 4.90 05/05/2021 03:11 PM    HGB 15.2 05/05/2021 03:11 PM    HCT 43.4 05/05/2021 03:11 PM    MCV 88.6 05/05/2021 03:11 PM    MCH 31.0 05/05/2021 03:11 PM    MCHC 35.0 (H) 05/05/2021 03:11 PM    RDW 13.1 05/05/2021 03:11 PM     05/05/2021 03:11 PM    MPV 9.7 05/05/2021 03:11 PM       A1C:  Lab Results   Component Value Date/Time    LABA1C 5.5 02/15/2021 10:37 AM       Lipid panel:  Lab Results   Component Value Date    CHOL 180 02/16/2021    CHOL 221 02/26/2020    CHOL 188 07/24/2019    TRIG 223 02/16/2021    TRIG 252 02/26/2020    TRIG 204 07/24/2019    HDL 40 02/16/2021    HDL 43 02/26/2020    HDL 46 07/24/2019        Lab Results   Component Value Date/Time    PROT 7.4 05/05/2021 03:11 PM    PROT 7.5 02/15/2021 10:37 AM    PROT 7.5 03/17/2017 10:23 AM       Lab Results   Component Value Date/Time    MG 1.9 02/16/2021 05:04 AM         Assessment. Cleopatra Gold was seen today for 3 month follow-up. Diagnoses and all orders for this visit:    Bilateral pulmonary embolism (HCC)    Lea's esophagus without dysplasia    Hyperlipidemia, unspecified hyperlipidemia type    Chronic migraine without aura without status migrainosus, not intractable       Patient Active Problem List   Diagnosis    Chest pain    Hypertension    Hyperlipidemia    Hiatal hernia    Lea esophagus    Acid reflux    Class 1 obesity due to excess calories without serious comorbidity with body mass index (BMI) of 32.0 to 32.9 in adult    Bilateral pulmonary embolism (HCC)    Pulmonary embolism, bilateral (HCC)    Chronic migraine without aura without status migrainosus, not intractable       Plan: Bilateral palm embolism continue Eliquis follow recommendation from Dr. Lizabeth Lucio    Lea's esophagus continue Dexilant diet modification follows with GI    Hyperlipidemia continue pravastatin      Chronic migraine doing better with Inderal      Call if any new symptoms    Follow-up in 3 months    Return in about 3 months (around 9/16/2021).        Didier Joseph MD  4:03 PM  6/16/2021     [place]

## 2021-06-29 RX ORDER — PROPRANOLOL HYDROCHLORIDE 10 MG/1
10 TABLET ORAL 3 TIMES DAILY
Qty: 90 TABLET | Refills: 3 | Status: SHIPPED
Start: 2021-06-29 | End: 2021-10-14 | Stop reason: SDUPTHER

## 2021-08-17 RX ORDER — PRAVASTATIN SODIUM 20 MG
20 TABLET ORAL DAILY
Qty: 90 TABLET | Refills: 1 | Status: SHIPPED | OUTPATIENT
Start: 2021-08-17 | End: 2022-02-07 | Stop reason: SDUPTHER

## 2021-09-23 ENCOUNTER — TELEPHONE (OUTPATIENT)
Dept: FAMILY MEDICINE CLINIC | Age: 51
End: 2021-09-23

## 2021-09-23 NOTE — TELEPHONE ENCOUNTER
I spoke to patient, he had COVID-19 recently, he had problem embolism, he is on Eliquis, I advised him that he should avoid hiking and climbing high-altitude for now unless it is absolutely necessary, he should wait at least 6 months after all the above

## 2021-09-23 NOTE — TELEPHONE ENCOUNTER
Patient is on Eliquis. [place] is [place] where he will be in higher altitudes. He also had a cough with covid. Is there anything he should be concerned about.     [phone]

## 2021-10-14 ENCOUNTER — OFFICE VISIT (OUTPATIENT)
Dept: FAMILY MEDICINE CLINIC | Age: 51
End: 2021-10-14
Payer: COMMERCIAL

## 2021-10-14 VITALS
RESPIRATION RATE: 16 BRPM | SYSTOLIC BLOOD PRESSURE: 96 MMHG | WEIGHT: 214.5 LBS | OXYGEN SATURATION: 97 % | HEART RATE: 73 BPM | TEMPERATURE: 97.4 F | BODY MASS INDEX: 31.77 KG/M2 | HEIGHT: 69 IN | DIASTOLIC BLOOD PRESSURE: 68 MMHG

## 2021-10-14 DIAGNOSIS — Z23 FLU VACCINE NEED: ICD-10-CM

## 2021-10-14 DIAGNOSIS — I10 PRIMARY HYPERTENSION: ICD-10-CM

## 2021-10-14 DIAGNOSIS — E78.5 HYPERLIPIDEMIA, UNSPECIFIED HYPERLIPIDEMIA TYPE: ICD-10-CM

## 2021-10-14 DIAGNOSIS — G43.709 CHRONIC MIGRAINE WITHOUT AURA WITHOUT STATUS MIGRAINOSUS, NOT INTRACTABLE: ICD-10-CM

## 2021-10-14 DIAGNOSIS — I26.99 BILATERAL PULMONARY EMBOLISM (HCC): Primary | ICD-10-CM

## 2021-10-14 PROCEDURE — 99213 OFFICE O/P EST LOW 20 MIN: CPT | Performed by: INTERNAL MEDICINE

## 2021-10-14 PROCEDURE — 90471 IMMUNIZATION ADMIN: CPT | Performed by: INTERNAL MEDICINE

## 2021-10-14 PROCEDURE — G8427 DOCREV CUR MEDS BY ELIG CLIN: HCPCS | Performed by: INTERNAL MEDICINE

## 2021-10-14 PROCEDURE — 1036F TOBACCO NON-USER: CPT | Performed by: INTERNAL MEDICINE

## 2021-10-14 PROCEDURE — G8417 CALC BMI ABV UP PARAM F/U: HCPCS | Performed by: INTERNAL MEDICINE

## 2021-10-14 PROCEDURE — 90674 CCIIV4 VAC NO PRSV 0.5 ML IM: CPT | Performed by: INTERNAL MEDICINE

## 2021-10-14 PROCEDURE — G8482 FLU IMMUNIZE ORDER/ADMIN: HCPCS | Performed by: INTERNAL MEDICINE

## 2021-10-14 RX ORDER — PROPRANOLOL HYDROCHLORIDE 10 MG/1
10 TABLET ORAL 3 TIMES DAILY
Qty: 270 TABLET | Refills: 1 | Status: SHIPPED | OUTPATIENT
Start: 2021-10-14 | End: 2022-05-02 | Stop reason: SDUPTHER

## 2021-10-14 NOTE — PROGRESS NOTES
 Frequency of Social Gatherings with Friends and Family:     Attends Confucianism Services:     Active Member of Clubs or Organizations:     Attends Club or Organization Meetings:     Marital Status:    Intimate Partner Violence:     Fear of Current or Ex-Partner:     Emotionally Abused:     Physically Abused:     Sexually Abused:         Past Surgical History:   Procedure Laterality Date    COLONOSCOPY  10/2017   [facility] SIN[facility]      2006    UPPER GASTROINTESTINAL ENDOSCOPY  2019        Family History   Problem Relation Age of Onset   Danitza Maherfast Disease Mother     Esophageal Cancer Father          age 47        Allergies   Allergen Reactions    Levaquin [Levofloxacin] Other (See Comments)     joint pain        ROS  No acute distress  Cardiac: Denies any chest pain or palpitation  Stress test 2021  Dr. Humberto Jain was negative, had echocardiogram EF 60 to 65%  Respiratory: Denies any cough or shortness of breath  CTA chest 2021 bilateral PE  GI: No abdominal pain. Denies any nausea vomiting or diarrhea  Lea's esophagus follows with Dr. Livia Witt  Colonoscopy 2017 by Dr. Livia Witt repeat in   : Denies any dysuria frequency or hematuria  Neuro: No headache or dizziness  Endocrine: No diabetes  Skin: normal  No recent weight gain or weight loss  Denies any change in vision    Objective:    BP 96/68 (Site: Left Upper Arm, Position: Sitting, Cuff Size: Large Adult)   Pulse 73   Temp 97.4 °F (36.3 °C) (Temporal)   Resp 16   Ht 5' 9\" (1.753 m)   Wt 214 lb 8 oz (97.3 kg)   SpO2 97%   BMI 31.68 kg/m²     Constitutional: Alert awake and oriented  Eyes: Pupils equal bilaterally. Extraocular muscles intact  Neck: no JVD adenopathy no bruit  Heart:  RRR, no murmurs, gallops, or rubs.   Lungs:    no wheeze, rales or rhonchi  Abd: bowel sounds present, nontender, nondistended, no masses  Extrem:  No clubbing, cyanosis, or edema  Neuro: AAOx3,No Focal deficit  Psychological: no depression or anxiety       Current Outpatient Medications   Medication Sig Dispense Refill    propranolol (INDERAL) 10 MG tablet Take 1 tablet by mouth 3 times daily 270 tablet 1    pravastatin (PRAVACHOL) 20 MG tablet take 1 tablet by mouth daily 90 tablet 1    apixaban (ELIQUIS) 5 MG TABS tablet Take 1 tablet by mouth 2 times daily 60 tablet 3    folic acid (FOLVITE) 1 MG tablet Take 1 tablet by mouth daily 90 tablet 3    Multiple Vitamins-Minerals (THERAPEUTIC MULTIVITAMIN-MINERALS) tablet Take 1 tablet by mouth daily      thiamine mononitrate (GNP VITAMIN B-1) 100 MG tablet Take 100 mg by mouth daily      Omega-3 Fatty Acids (FISH OIL) 500 MG CAPS Take 500 mg by mouth daily      dexlansoprazole (DEXILANT) 60 MG CPDR capsule Take 60 mg by mouth daily. No current facility-administered medications for this visit.         Last 3 BMP  Lab Results   Component Value Date/Time     05/05/2021 03:11 PM     03/03/2021 04:19 PM     02/16/2021 05:04 AM    K 4.5 05/05/2021 03:11 PM    K 4.4 03/03/2021 04:19 PM    K 3.8 02/16/2021 05:04 AM    K 4.2 02/15/2021 10:37 AM    K 4.1 02/26/2020 12:00 AM    K 4.3 07/24/2019 12:00 AM    K 5.6 (H) 03/17/2017 10:23 AM     05/05/2021 03:11 PM     03/03/2021 04:19 PM     02/16/2021 05:04 AM    CO2 28 05/05/2021 03:11 PM    CO2 28 03/03/2021 04:19 PM    CO2 27 02/16/2021 05:04 AM    BUN 15 05/05/2021 03:11 PM    BUN 13 03/03/2021 04:19 PM    BUN 14 02/16/2021 05:04 AM    CREATININE 1.1 05/05/2021 03:11 PM    CREATININE 1.1 03/03/2021 04:19 PM    CREATININE 1.1 03/03/2021 02:43 PM    CREATININE 1.1 02/16/2021 05:04 AM    CREATININE 1.1 02/26/2020 12:00 AM    CREATININE 1.1 07/24/2019 12:00 AM    GLUCOSE 111 (H) 05/05/2021 03:11 PM    GLUCOSE 99 03/03/2021 04:19 PM    GLUCOSE 104 (H) 02/16/2021 05:04 AM    CALCIUM 9.3 05/05/2021 03:11 PM    CALCIUM 9.7 03/03/2021 04:19 PM    CALCIUM 9.0 02/16/2021 05:04 AM       Last 3 CMP:    Lab Results   Component Value Date/Time     05/05/2021 03:11 PM     03/03/2021 04:19 PM     02/16/2021 05:04 AM    K 4.5 05/05/2021 03:11 PM    K 4.4 03/03/2021 04:19 PM    K 3.8 02/16/2021 05:04 AM    K 4.2 02/15/2021 10:37 AM    K 4.1 02/26/2020 12:00 AM    K 4.3 07/24/2019 12:00 AM    K 5.6 (H) 03/17/2017 10:23 AM     05/05/2021 03:11 PM     03/03/2021 04:19 PM     02/16/2021 05:04 AM    CO2 28 05/05/2021 03:11 PM    CO2 28 03/03/2021 04:19 PM    CO2 27 02/16/2021 05:04 AM    BUN 15 05/05/2021 03:11 PM    BUN 13 03/03/2021 04:19 PM    BUN 14 02/16/2021 05:04 AM    CREATININE 1.1 05/05/2021 03:11 PM    CREATININE 1.1 03/03/2021 04:19 PM    CREATININE 1.1 03/03/2021 02:43 PM    CREATININE 1.1 02/16/2021 05:04 AM    CREATININE 1.1 02/26/2020 12:00 AM    CREATININE 1.1 07/24/2019 12:00 AM    GLUCOSE 111 (H) 05/05/2021 03:11 PM    GLUCOSE 99 03/03/2021 04:19 PM    GLUCOSE 104 (H) 02/16/2021 05:04 AM    CALCIUM 9.3 05/05/2021 03:11 PM    CALCIUM 9.7 03/03/2021 04:19 PM    CALCIUM 9.0 02/16/2021 05:04 AM    PROT 7.4 05/05/2021 03:11 PM    PROT 7.5 02/15/2021 10:37 AM    PROT 7.5 03/17/2017 10:23 AM    LABALBU 4.2 05/05/2021 03:11 PM    LABALBU 4.5 02/15/2021 10:37 AM    LABALBU 4.7 03/17/2017 10:23 AM    BILITOT 0.4 05/05/2021 03:11 PM    BILITOT 0.4 02/15/2021 10:37 AM    BILITOT 0.4 03/17/2017 10:23 AM    ALKPHOS 61 05/05/2021 03:11 PM    ALKPHOS 62 02/15/2021 10:37 AM    ALKPHOS 54 03/17/2017 10:23 AM    AST 22 05/05/2021 03:11 PM    AST 31 02/15/2021 10:37 AM    AST 19 03/17/2017 10:23 AM    ALT 29 05/05/2021 03:11 PM    ALT 35 02/15/2021 10:37 AM    ALT 27 03/17/2017 10:23 AM        CBC:   Lab Results   Component Value Date/Time    WBC 5.4 05/05/2021 03:11 PM    RBC 4.90 05/05/2021 03:11 PM    HGB 15.2 05/05/2021 03:11 PM    HCT 43.4 05/05/2021 03:11 PM    MCV 88.6 05/05/2021 03:11 PM    MCH 31.0 05/05/2021 03:11 PM    MCHC 35.0 (H) 05/05/2021 03:11 PM    RDW 13.1 05/05/2021 03:11 PM     05/05/2021 03:11 PM    MPV 9.7 05/05/2021 03:11 PM       A1C:  Lab Results   Component Value Date/Time    LABA1C 5.5 02/15/2021 10:37 AM       Lipid panel:  Lab Results   Component Value Date    CHOL 180 02/16/2021    CHOL 221 02/26/2020    CHOL 188 07/24/2019    TRIG 223 02/16/2021    TRIG 252 02/26/2020    TRIG 204 07/24/2019    HDL 40 02/16/2021    HDL 43 02/26/2020    HDL 46 07/24/2019        Lab Results   Component Value Date/Time    PROT 7.4 05/05/2021 03:11 PM    PROT 7.5 02/15/2021 10:37 AM    PROT 7.5 03/17/2017 10:23 AM       Lab Results   Component Value Date/Time    MG 1.9 02/16/2021 05:04 AM         Assessment. Gogo Tubbs was seen today for 3 month follow-up. Diagnoses and all orders for this visit:    Bilateral pulmonary embolism ([address]  -     CBC WITH AUTO DIFFERENTIAL; Future    Flu vaccine need  -     INFLUENZA, MDCK QUADV, 2 YRS AND OLDER, IM, PF, PREFILL SYR OR SDV, 0.5ML (FLUCELVAX QUADV, PF)    Primary hypertension  -     CBC WITH AUTO DIFFERENTIAL; Future    Chronic migraine without aura without status migrainosus, not intractable  -     CBC WITH AUTO DIFFERENTIAL; Future    Hyperlipidemia, unspecified hyperlipidemia type  -     COMPREHENSIVE METABOLIC PANEL; Future  -     LIPID PANEL; Future    Other orders  -     propranolol (INDERAL) 10 MG tablet;  Take 1 tablet by mouth 3 times daily       Patient Active Problem List   Diagnosis    Chest pain    Hypertension    Hyperlipidemia    Hiatal hernia    Lea esophagus    Acid reflux    Class 1 obesity due to excess calories without serious comorbidity with body mass index (BMI) of 32.0 to 32.9 in adult    Bilateral pulmonary embolism (HCC)    Pulmonary embolism, bilateral (HCC)    Chronic migraine without aura without status migrainosus, not intractable       Plan: Bilateral PE on Eliquis tolerating well with no bleeding or bruising  Has a follow-up appoint with the oncologist  Continue this for 1 year total then will make a decision as per oncology    Chronic migraines doing much better with propranolol 30 mg daily  He used to get severe headache but no headaches now    Hyperlipidemia check CMP lipid profile continue pravastatin      Fall precaution discussed    Check CBC CMP lipid profile    Return in about 3 months (around 1/14/2022).        Akbar Lopez MD  4:56 PM  10/14/2021     [place]

## 2021-12-23 ENCOUNTER — TELEPHONE (OUTPATIENT)
Dept: FAMILY MEDICINE CLINIC | Age: 51
End: 2021-12-23

## 2021-12-23 NOTE — TELEPHONE ENCOUNTER
----- Message from Miranda Jose sent at 2021  2:40 PM EST -----  Subject: Appointment Request    Reason for Call: New Patient Request Appointment    QUESTIONS  Type of Appointment? New Patient/New to Provider  Reason for appointment request? No appointments available during search  Additional Information for Provider? PT would like to be seen by Dr. Vinicius Zhu. Please call PT to schedule new PT appt. Pt needs 3 mo checkup appt   scheduled. Pt will get labs done before appt.  ---------------------------------------------------------------------------  --------------  CALL BACK INFO  What is the best way for the office to contact you? OK to leave message on   voicemail  Preferred Call Back Phone Number? [phone]  ---------------------------------------------------------------------------  --------------  SCRIPT ANSWERS  Relationship to Patient? Self  Is this the first appointment to establish care for a ? No  Have you been diagnosed with, awaiting test results for, or told that you   are suspected of having COVID-19 (Coronavirus)? (If patient has tested   negative or was tested as a requirement for work, school, or travel and   not based on symptoms, answer no)? No  Within the past two weeks have you developed any of the following symptoms   (answer no if symptoms have been present longer than 2 weeks or began   more than 2 weeks ago)? Fever or Chills, Cough, Shortness of breath or   difficulty breathing, Loss of taste or smell, Sore throat, Nasal   congestion, Sneezing or runny nose, Fatigue or generalized body aches   (answer no if pain is specific to a body part e.g. back pain), Diarrhea,   Headache? No  Have you had close contact with someone with COVID-19 in the last 14 days? No  (Service Expert - click yes below to proceed with [facility]   Scheduling)?  Yes

## 2021-12-23 NOTE — TELEPHONE ENCOUNTER
I called patient and informed that Dr. Renato Pizano is accepting new patients on a case by case basis.   Informed will send msg with request.

## 2021-12-27 NOTE — TELEPHONE ENCOUNTER
I called patient and informed, per Dr. Renato Pizano, ok to schedule.   Appt made on 2/7/22 at 1:00 pm.

## 2022-01-04 ENCOUNTER — HOSPITAL ENCOUNTER (EMERGENCY)
Age: 52
Discharge: HOME | End: 2022-01-04
Payer: COMMERCIAL

## 2022-01-04 VITALS
HEIGHT: 69 IN | DIASTOLIC BLOOD PRESSURE: 97 MMHG | RESPIRATION RATE: 18 BRPM | HEART RATE: 89 BPM | WEIGHT: 219 LBS | SYSTOLIC BLOOD PRESSURE: 137 MMHG | TEMPERATURE: 97.2 F | OXYGEN SATURATION: 96 % | BODY MASS INDEX: 32.44 KG/M2

## 2022-01-04 DIAGNOSIS — J20.9 ACUTE BRONCHITIS, UNSPECIFIED ORGANISM: Primary | ICD-10-CM

## 2022-01-04 PROCEDURE — 99211 OFF/OP EST MAY X REQ PHY/QHP: CPT

## 2022-01-04 RX ORDER — DOXYCYCLINE HYCLATE 100 MG
100 TABLET ORAL 2 TIMES DAILY
Qty: 14 TABLET | Refills: 0 | Status: SHIPPED | OUTPATIENT
Start: 2022-01-04 | End: 2022-01-11

## 2022-01-04 NOTE — ED PROVIDER NOTES
[address] [facility]  Department of Emergency Medicine  UC Encounter Note  22   9:05 AM EST      NAME: Gordon Reilly  :  1970  MRN:  [id number]    Chief Complaint: Nasal Congestion (sore throat headache  chills body ache tested on  results yesterday neg   ears hurt)      This is a 22-year-old male the presents to urgent care complaining of a cough and some chest congestion. Has been taking over-the-counter cough and cold medications. He states it started out as more of an upper respiratory infection now is moving to his chest.  Denies shortness of breath. No chest pain. He states he took a Covid test recently it was negative. Review of Systems  Pertinent positives and negatives are stated within HPI, all other systems reviewed and are negative. Physical Exam  Vitals and nursing note reviewed. Constitutional:       Appearance: He is well-developed. HENT:      Head: Normocephalic and atraumatic. Jaw: There is normal jaw occlusion. No trismus. Right Ear: Hearing, tympanic membrane, ear canal and external ear normal.      Left Ear: Hearing, tympanic membrane, ear canal and external ear normal.      Nose: Nose normal.      Right Sinus: No maxillary sinus tenderness or frontal sinus tenderness. Left Sinus: No maxillary sinus tenderness or frontal sinus tenderness. Mouth/Throat:      Pharynx: Uvula midline. No uvula swelling. Eyes:      General: Lids are normal.      Conjunctiva/sclera: Conjunctivae normal.      Pupils: Pupils are equal, round, and reactive to light. Cardiovascular:      Rate and Rhythm: Normal rate and regular rhythm. Heart sounds: Normal heart sounds. No murmur heard. Pulmonary:      Effort: Pulmonary effort is normal.      Breath sounds: Normal breath sounds. Abdominal:      General: Bowel sounds are normal.      Palpations: Abdomen is soft. Abdomen is not rigid. Tenderness:  There is no abdominal tenderness. There is no guarding or rebound. Musculoskeletal:      Cervical back: Normal range of motion and neck supple. Skin:     General: Skin is warm and dry. Findings: No abrasion or rash. Neurological:      General: No focal deficit present. Mental Status: He is alert and oriented to person, place, and time. GCS: GCS eye subscore is 4. GCS verbal subscore is 5. GCS motor subscore is 6. Cranial Nerves: No cranial nerve deficit. Sensory: No sensory deficit. Coordination: Coordination normal.      Gait: Gait normal.         Procedures    MDM         --------------------------------------------- PAST HISTORY ---------------------------------------------  Past Medical History:  has a past medical history of Acid reflux, Lea esophagus, H/O cardiovascular stress test, Hiatal hernia, Hyperlipidemia, and Hypertension. Past Surgical History:  has a past surgical history that includes sinus surgery; Colonoscopy (10/2017); and Upper gastrointestinal endoscopy (11/2019). Social History:  reports that he has never smoked. He has never used smokeless tobacco. He reports current alcohol use of about 1.0 standard drink of alcohol per week. He reports that he does not use drugs. Family History: family history includes Esophageal Cancer in his father; Graves Disease in his mother. The patients home medications have been reviewed. Allergies: Levaquin [levofloxacin]    -------------------------------------------------- RESULTS -------------------------------------------------  No results found for this visit on 01/04/22. No orders to display       ------------------------- NURSING NOTES AND VITALS REVIEWED ---------------------------   The nursing notes within the ED encounter and vital signs as below have been reviewed.    BP (!) 137/97   Pulse 89   Temp 97.2 °F (36.2 °C)   Resp 18   Ht 5' 9\" (1.753 m)   Wt 219 lb (99.3 kg)   SpO2 96%   BMI 32.34 kg/m²   Oxygen Saturation Interpretation: Normal      ------------------------------------------ PROGRESS NOTES ------------------------------------------   I have spoken with the patient and discussed todays results, in addition to providing specific details for the plan of care and counseling regarding the diagnosis and prognosis. Their questions are answered at this time and they are agreeable with the plan.      --------------------------------- ADDITIONAL PROVIDER NOTES ---------------------------------     This patient is stable for discharge. I have shared the specific conditions for return, as well as the importance of follow-up. * NOTE: This report was transcribed using voice recognition software. Every effort was made to ensure accuracy; however, inadvertent computerized transcription errors may be present.    --------------------------------- IMPRESSION AND DISPOSITION ---------------------------------    IMPRESSION  1.  Acute bronchitis, unspecified organism        DISPOSITION  Disposition: Discharge to home  Patient condition is good       Renato Jha PA-C  01/04/22 0916

## 2022-02-07 ENCOUNTER — OFFICE VISIT (OUTPATIENT)
Dept: FAMILY MEDICINE CLINIC | Age: 52
End: 2022-02-07
Payer: COMMERCIAL

## 2022-02-07 VITALS
HEIGHT: 69 IN | DIASTOLIC BLOOD PRESSURE: 80 MMHG | OXYGEN SATURATION: 98 % | RESPIRATION RATE: 16 BRPM | BODY MASS INDEX: 32.29 KG/M2 | HEART RATE: 76 BPM | WEIGHT: 218 LBS | SYSTOLIC BLOOD PRESSURE: 120 MMHG

## 2022-02-07 DIAGNOSIS — R42 VERTIGO: ICD-10-CM

## 2022-02-07 DIAGNOSIS — I26.99 BILATERAL PULMONARY EMBOLISM (HCC): ICD-10-CM

## 2022-02-07 DIAGNOSIS — E78.5 DYSLIPIDEMIA: ICD-10-CM

## 2022-02-07 DIAGNOSIS — I10 ESSENTIAL HYPERTENSION: ICD-10-CM

## 2022-02-07 DIAGNOSIS — K22.70 BARRETT'S ESOPHAGUS WITHOUT DYSPLASIA: ICD-10-CM

## 2022-02-07 DIAGNOSIS — G43.709 CHRONIC MIGRAINE WITHOUT AURA WITHOUT STATUS MIGRAINOSUS, NOT INTRACTABLE: ICD-10-CM

## 2022-02-07 DIAGNOSIS — R73.09 ELEVATED GLUCOSE: ICD-10-CM

## 2022-02-07 DIAGNOSIS — Z76.89 ENCOUNTER TO ESTABLISH CARE: Primary | ICD-10-CM

## 2022-02-07 PROCEDURE — G8427 DOCREV CUR MEDS BY ELIG CLIN: HCPCS | Performed by: FAMILY MEDICINE

## 2022-02-07 PROCEDURE — G8417 CALC BMI ABV UP PARAM F/U: HCPCS | Performed by: FAMILY MEDICINE

## 2022-02-07 PROCEDURE — 99214 OFFICE O/P EST MOD 30 MIN: CPT | Performed by: FAMILY MEDICINE

## 2022-02-07 PROCEDURE — 1036F TOBACCO NON-USER: CPT | Performed by: FAMILY MEDICINE

## 2022-02-07 PROCEDURE — G8482 FLU IMMUNIZE ORDER/ADMIN: HCPCS | Performed by: FAMILY MEDICINE

## 2022-02-07 RX ORDER — MECLIZINE HYDROCHLORIDE 25 MG/1
25 TABLET ORAL 3 TIMES DAILY PRN
Qty: 15 TABLET | Refills: 0 | Status: SHIPPED | OUTPATIENT
Start: 2022-02-07 | End: 2022-02-17

## 2022-02-07 RX ORDER — PRAVASTATIN SODIUM 20 MG
20 TABLET ORAL DAILY
Qty: 90 TABLET | Refills: 1 | Status: SHIPPED
Start: 2022-02-07 | End: 2022-08-11

## 2022-02-07 ASSESSMENT — ENCOUNTER SYMPTOMS
DIARRHEA: 0
SORE THROAT: 0
CONSTIPATION: 0
SHORTNESS OF BREATH: 0
ABDOMINAL PAIN: 0
SINUS PAIN: 0
COUGH: 0
BACK PAIN: 0
RHINORRHEA: 0
NAUSEA: 0
VOMITING: 0

## 2022-02-07 NOTE — PATIENT INSTRUCTIONS
click on the \"Sign Up Now\" link. Current as of: April 8, 2021               Content Version: 13.1  © 2006-2021 [employer]. Care instructions adapted under license by [facility] ([facility]). If you have questions about a medical condition or this instruction, always ask your healthcare professional. [place] [address] any warranty or liability for your use of this information.

## 2022-02-07 NOTE — PROGRESS NOTES
2022    Chief Complaint   Patient presents with    New Patient     previous pcp Dr Suhas Murillo. GI Dr Jerald Dave. Dr Minerva Erickson blood clots. Patient is here to establish care but c/o dizziness has had 2 episodes in the last few months. Brian Melo (:  1970) is a 54 y.o. male, here for establishing care. They report a PMH of:  Past Medical History:   Diagnosis Date    Acid reflux     Lea esophagus     H/O cardiovascular stress test 2021    Exercise Nuclear Treadmill    Hiatal hernia     Hyperlipidemia     Hypertension        Social and family histories reviewed and updated as appropriate.    He was previously a patient of Dr. Suhas Murillo  He also follows with GI (Dr. Jerald Dave), Heme/Onc (Dr. Minerva Erickson)   3 children  Finance    F/U of chronic problem(s) and new or recent complaint of establish care, occasional dizziness   Chronic problems reviewed today include:  HLD, HTN, migraines, Lea's, PE  Current status of this/these condition(s):  stable  Tolerating meds: Yes    Hyperlipidemia  Current treatment: Pravastatin 20 mg daily  Recent medication changes: none  No new myalgias or GI upset    Lab Results   Component Value Date    CHOL 180 2021    TRIG 223 (H) 2021    HDL 40 2021    LDLCALC 95 2021     Lab Results   Component Value Date    ALT 29 2021    AST 22 2021        Hypertension / history of migraines  Current treatment: Propranolol 10 mg 3 times daily  Recent medication changes: none  Denies any cardiac history  Admitted 2/15/2021 with chest pain, stress testing at that time:  Impression   The myocardial perfusion imaging was normal with attenuation and   motion artifacts   Overall left ventricular systolic function was normal   Duke treadmill score was +9 implying low risk.     Exercise capacity was average   Low risk nuclear exercise treadmill test.         BP Readings from Last 3 Encounters:   22 120/80   22 (!) 137/97 10/14/21 96/68                                          Sodium (mmol/L)   Date Value   05/05/2021 141    BUN (mg/dL)   Date Value   05/05/2021 15    Glucose (mg/dL)   Date Value   05/05/2021 111 (H)      Potassium (mmol/L)   Date Value   05/05/2021 4.5     Potassium reflex Magnesium (mmol/L)   Date Value   03/03/2021 4.4    Creatinine (mg/dL)   Date Value   02/26/2020 1.1     CREATININE (mg/dL)   Date Value   05/05/2021 1.1         GERD / Barretts'  Current treatment: Dexilant 60 mg daily  Recent medication changes: none  Last EGD about 2 years prior, usually q 3 years  Family history of esophageal cancer (father)  Follow with GI    PE  Current treatment: Eliquis 5 mg twice daily  Recent medication changes: none; started treatment March 2021  Admitted 3/3 - 3/6/21  Following with Heme/Onc    Review of Systems   Constitutional: Negative for chills, fatigue and fever. HENT: Negative for congestion, rhinorrhea, sinus pain and sore throat. Respiratory: Negative for cough and shortness of breath. Cardiovascular: Negative for chest pain, palpitations and leg swelling. Gastrointestinal: Negative for abdominal pain, constipation, diarrhea, nausea and vomiting. Genitourinary: Negative for difficulty urinating. Musculoskeletal: Negative for arthralgias, back pain and gait problem. Skin: Negative for rash. Neurological: Positive for dizziness. Negative for weakness and numbness. Hematological: Does not bruise/bleed easily. Prior to Visit Medications    Medication Sig Taking?  Authorizing Provider   pravastatin (PRAVACHOL) 20 MG tablet Take 1 tablet by mouth daily Yes Deep Croft DO   meclizine (ANTIVERT) 25 MG tablet Take 1 tablet by mouth 3 times daily as needed for Dizziness Yes Deep Croft DO   apixaban (ELIQUIS) 5 MG TABS tablet Take 1 tablet by mouth 2 times daily Yes Rusty Guaman MD   propranolol (INDERAL) 10 MG tablet Take 1 tablet by mouth 3 times daily Yes Rusty Guaman MD   folic acid (FOLVITE) 1 MG tablet Take 1 tablet by mouth daily Yes Marvin Kruse MD   Multiple Vitamins-Minerals (THERAPEUTIC MULTIVITAMIN-MINERALS) tablet Take 1 tablet by mouth daily Yes Historical Provider, MD   thiamine mononitrate (GNP VITAMIN B-1) 100 MG tablet Take 100 mg by mouth daily Yes Historical Provider, MD   Omega-3 Fatty Acids (FISH OIL) 500 MG CAPS Take 500 mg by mouth daily Yes Historical Provider, MD   dexlansoprazole (DEXILANT) 60 MG CPDR capsule Take 60 mg by mouth daily. Yes Historical Provider, MD        Allergies   Allergen Reactions    Levaquin [Levofloxacin] Other (See Comments)     joint pain       Past Surgical History:   Procedure Laterality Date    COLONOSCOPY  10/2017   Meza SINUS SURGERY      2006    UPPER GASTROINTESTINAL ENDOSCOPY  11/2019       Social History     Socioeconomic History    Marital status:      Spouse name: Not on file    Number of children: Not on file    Years of education: Not on file    Highest education level: Not on file   Occupational History    Not on file   Tobacco Use    Smoking status: Never Smoker    Smokeless tobacco: Never Used   Vaping Use    Vaping Use: Never used   Substance and Sexual Activity    Alcohol use: Not Currently     Alcohol/week: 1.0 standard drink     Types: 1 Cans of beer per week    Drug use: No    Sexual activity: Yes     Partners: Female   Other Topics Concern    Not on file   Social History Narrative    Not on file     Social Determinants of Health     Financial Resource Strain: Low Risk     Difficulty of Paying Living Expenses: Not hard at all   Food Insecurity: No Food Insecurity    Worried About Running Out of Food in the Last Year: Never true    Gisselle of Food in the Last Year: Never true   Transportation Needs:     Lack of Transportation (Medical): Not on file    Lack of Transportation (Non-Medical):  Not on file   Physical Activity:     Days of Exercise per Week: Not on file    Minutes of Exercise per Session: Not on file   Stress:     Feeling of Stress : Not on file   Social Connections:     Frequency of Communication with Friends and Family: Not on file    Frequency of Social Gatherings with Friends and Family: Not on file    Attends Temple Services: Not on file    Active Member of [address] [place] [address] or Organizations: Not on file    Attends Club or Organization Meetings: Not on file    Marital Status: Not on file   Intimate Partner Violence:     Fear of Current or Ex-Partner: Not on file    Emotionally Abused: Not on file    Physically Abused: Not on file    Sexually Abused: Not on file   Housing Stability:     Unable to Pay for Housing in the Last Year: Not on file    Number of [place] in the Last Year: Not on file    Unstable Housing in the Last Year: Not on file        Family History   Problem Relation Age of Onset    Esophageal Cancer Father          age 47   Naboretta Nipper Graves Disease Sister     Other Sister         hypoglcemia       Vitals:    22 1256   BP: 120/80   Pulse: 76   Resp: 16   SpO2: 98%   Weight: 218 lb (98.9 kg)   Height: 5' 9\" (1.753 m)     Estimated body mass index is 32.19 kg/m² as calculated from the following:    Height as of this encounter: 5' 9\" (1.753 m). Weight as of this encounter: 218 lb (98.9 kg). Physical Exam  Constitutional:       General: He is not in acute distress. Appearance: He is well-developed. HENT:      Head: Normocephalic and atraumatic. Eyes:      Extraocular Movements: Extraocular movements intact. Conjunctiva/sclera: Conjunctivae normal.   Cardiovascular:      Rate and Rhythm: Normal rate and regular rhythm. Pulmonary:      Effort: Pulmonary effort is normal. No respiratory distress. Breath sounds: Normal breath sounds. No wheezing, rhonchi or rales. Abdominal:      General: There is no distension. Musculoskeletal:      Right lower leg: No edema. Left lower leg: No edema.    Neurological:      General: No focal deficit present. Mental Status: He is alert. Mental status is at baseline. ASSESSMENT/PLAN:  Rosina Bradford was seen today for new patient. Diagnoses and all orders for this visit:    Encounter to establish care    Dyslipidemia  -     pravastatin (PRAVACHOL) 20 MG tablet; Take 1 tablet by mouth daily  -     TSH; Future  -     Comprehensive Metabolic Panel; Future  -     LIPID PANEL; Future    Essential hypertension  -     TSH; Future  -     Comprehensive Metabolic Panel; Future  -     CBC Auto Differential; Future  -     HEMOGLOBIN A1C; Future  -     LIPID PANEL; Future    Chronic migraine without aura without status migrainosus, not intractable  -     TSH; Future    Lea's esophagus without dysplasia    Bilateral pulmonary embolism (HCC)    Vertigo  -     meclizine (ANTIVERT) 25 MG tablet; Take 1 tablet by mouth 3 times daily as needed for Dizziness    Elevated glucose  -     HEMOGLOBIN A1C; Future      Additional plan and future considerations:  As above. EMR reviewed. Will notify of lab results via [address] RTO in 6 months or sooner if needed pending labs    Educational materials and/or home exercises printed for patient's review and were included in patient instructions on his/her After Visit Summary and given to patient at the end of visit.         Future Appointments   Date Time Provider Sangita Corey   4/8/2022 11:00 AM [address] [facility] [address] [place]   4/11/2022 11:00 AM Emily Hawkins MD Blood Cancer [facility]   8/18/2022  8:00 AM Estil Appl, DO [address] [facility]         --Estil Appl, DO on 2/7/2022 at 1:33 PM

## 2022-02-11 DIAGNOSIS — E78.5 DYSLIPIDEMIA: ICD-10-CM

## 2022-02-11 RX ORDER — PRAVASTATIN SODIUM 20 MG
20 TABLET ORAL DAILY
Qty: 90 TABLET | Refills: 1 | OUTPATIENT
Start: 2022-02-11

## 2022-04-08 ENCOUNTER — HOSPITAL ENCOUNTER (OUTPATIENT)
Dept: INFUSION THERAPY | Age: 52
Discharge: HOME | End: 2022-04-08
Payer: COMMERCIAL

## 2022-04-08 DIAGNOSIS — I26.99 ACUTE PULMONARY EMBOLISM WITHOUT ACUTE COR PULMONALE, UNSPECIFIED PULMONARY EMBOLISM TYPE (HCC): ICD-10-CM

## 2022-04-08 LAB
ALBUMIN SERPL-MCNC: 4.2 G/DL (ref 3.5–5.2)
ALP BLD-CCNC: 62 U/L (ref 40–129)
ALT SERPL-CCNC: 37 U/L (ref 0–40)
ANION GAP SERPL CALCULATED.3IONS-SCNC: 11 MMOL/L (ref 7–16)
AST SERPL-CCNC: 27 U/L (ref 0–39)
BASOPHILS ABSOLUTE: 0.07 E9/L (ref 0–0.2)
BASOPHILS RELATIVE PERCENT: 1.2 % (ref 0–2)
BILIRUB SERPL-MCNC: 0.5 MG/DL (ref 0–1.2)
BUN BLDV-MCNC: 11 MG/DL (ref 6–20)
CALCIUM SERPL-MCNC: 9.4 MG/DL (ref 8.6–10.2)
CHLORIDE BLD-SCNC: 101 MMOL/L (ref 98–107)
CO2: 27 MMOL/L (ref 22–29)
CREAT SERPL-MCNC: 1.1 MG/DL (ref 0.7–1.2)
D DIMER: <200 NG/ML DDU
EOSINOPHILS ABSOLUTE: 0.08 E9/L (ref 0.05–0.5)
EOSINOPHILS RELATIVE PERCENT: 1.4 % (ref 0–6)
GFR AFRICAN AMERICAN: >60
GFR NON-AFRICAN AMERICAN: >60 ML/MIN/1.73
GLUCOSE BLD-MCNC: 96 MG/DL (ref 74–99)
HCT VFR BLD CALC: 45.8 % (ref 37–54)
HEMOGLOBIN: 15.9 G/DL (ref 12.5–16.5)
IMMATURE GRANULOCYTES #: 0.01 E9/L
IMMATURE GRANULOCYTES %: 0.2 % (ref 0–5)
LYMPHOCYTES ABSOLUTE: 1.28 E9/L (ref 1.5–4)
LYMPHOCYTES RELATIVE PERCENT: 22.3 % (ref 20–42)
MCH RBC QN AUTO: 31.2 PG (ref 26–35)
MCHC RBC AUTO-ENTMCNC: 34.7 % (ref 32–34.5)
MCV RBC AUTO: 89.8 FL (ref 80–99.9)
MONOCYTES ABSOLUTE: 0.78 E9/L (ref 0.1–0.95)
MONOCYTES RELATIVE PERCENT: 13.6 % (ref 2–12)
NEUTROPHILS ABSOLUTE: 3.51 E9/L (ref 1.8–7.3)
NEUTROPHILS RELATIVE PERCENT: 61.3 % (ref 43–80)
PDW BLD-RTO: 12.8 FL (ref 11.5–15)
PLATELET # BLD: 307 E9/L (ref 130–450)
PMV BLD AUTO: 9.4 FL (ref 7–12)
POTASSIUM SERPL-SCNC: 4.6 MMOL/L (ref 3.5–5)
RBC # BLD: 5.1 E12/L (ref 3.8–5.8)
SODIUM BLD-SCNC: 139 MMOL/L (ref 132–146)
TOTAL PROTEIN: 7.8 G/DL (ref 6.4–8.3)
WBC # BLD: 5.7 E9/L (ref 4.5–11.5)

## 2022-04-08 PROCEDURE — 85025 COMPLETE CBC W/AUTO DIFF WBC: CPT

## 2022-04-08 PROCEDURE — 36415 COLL VENOUS BLD VENIPUNCTURE: CPT

## 2022-04-08 PROCEDURE — 85378 FIBRIN DEGRADE SEMIQUANT: CPT

## 2022-04-08 PROCEDURE — 80053 COMPREHEN METABOLIC PANEL: CPT

## 2022-04-11 ENCOUNTER — OFFICE VISIT (OUTPATIENT)
Dept: ONCOLOGY | Age: 52
End: 2022-04-11
Payer: COMMERCIAL

## 2022-04-11 VITALS
SYSTOLIC BLOOD PRESSURE: 130 MMHG | BODY MASS INDEX: 32.58 KG/M2 | OXYGEN SATURATION: 99 % | HEIGHT: 69 IN | TEMPERATURE: 98.5 F | DIASTOLIC BLOOD PRESSURE: 102 MMHG | HEART RATE: 79 BPM | WEIGHT: 220 LBS

## 2022-04-11 DIAGNOSIS — I26.99 ACUTE PULMONARY EMBOLISM WITHOUT ACUTE COR PULMONALE, UNSPECIFIED PULMONARY EMBOLISM TYPE (HCC): Primary | ICD-10-CM

## 2022-04-11 PROCEDURE — 99212 OFFICE O/P EST SF 10 MIN: CPT

## 2022-04-11 RX ORDER — FOLIC ACID 1 MG/1
1 TABLET ORAL DAILY
Qty: 90 TABLET | Refills: 3 | Status: SHIPPED | OUTPATIENT
Start: 2022-04-11

## 2022-04-11 NOTE — PROGRESS NOTES
[address] [facility]. [place], [place]        Pt Name: Lavina Lesch: 1970  Date of evaluation: 4/11/2022  Primary Care Physician: Jess Desai DO  Reason for evaluation:   Chief Complaint   Patient presents with    Other     acute PE        Subjective: Here for labs results and follow up. Doing well. Tolerating Eliquis 5 mg twice daily without any bleeding manifestations. Completely recovered from Covid which he had back in January 2021    Thrombophilia DNA Assay   RESULTS:      FACTOR V LEIDEN     HETEROZYGOTE         Normal and Factor V Leiden alleles were present        PROTHROMBIN 33587Q  No mutation detected         Only the normal allele was present        MTHFR 677 (C->T)    HETEROZYGOTE         Normal and MTHFR 677)C->T) alleles were present        MTHFR 1298 (A->C)   No mutation detected         Only the normal allele was present        XOCHITL-1 genotype      HOMOZYGOTE (4G/4G)        FACTOR XIII V34L    No mutation detected          Only the normal allele was present       D-Dimer, Quant <200  ng/mL DDU Final             OBJECTIVE:  VITALS:  height is 5' 9\" (1.753 m) and weight is 220 lb (99.8 kg). His temperature is 98.5 °F (36.9 °C). His blood pressure is 130/102 (abnormal) and his pulse is 79. His oxygen saturation is 99%. Physical Exam:  Performance Status: 0  Well developed, well nourished male  General: AAO to person, place, time, in  no acute distress. Head and neck: PERRLA, EOMI . Sclera non icteric. Oropharynx: Clear. Neck: no JVD,  no adenopathy, no carotid bruit. Heart: Regular rate and regular rhythm, no murmur. Lungs: Clear to auscultation. Abdomen: Soft, non-tender;no masses, no organomegaly. Extremities: No edema,no cyanosis, no clubbing. Neurologic:Cranial nerves grossly intact. No focal motor or sensory deficits. Skin:  No rash. Medications  Prior to Admission medications    Medication Sig Start Date End Date Taking? Authorizing Provider   pravastatin (PRAVACHOL) 20 MG tablet Take 1 tablet by mouth daily 2/7/22  Yes Deep Croft DO   apixaban (ELIQUIS) 5 MG TABS tablet Take 1 tablet by mouth 2 times daily 12/15/21  Yes Isreal Jimenez MD   folic acid (FOLVITE) 1 MG tablet Take 1 tablet by mouth daily 6/9/21  Yes Yady Camacho MD   Multiple Vitamins-Minerals (THERAPEUTIC MULTIVITAMIN-MINERALS) tablet Take 1 tablet by mouth daily   Yes Historical Provider, MD   thiamine mononitrate (GNP VITAMIN B-1) 100 MG tablet Take 100 mg by mouth daily   Yes Historical Provider, MD   Omega-3 Fatty Acids (FISH OIL) 500 MG CAPS Take 500 mg by mouth daily   Yes Historical Provider, MD   dexlansoprazole (DEXILANT) 60 MG CPDR capsule Take 60 mg by mouth daily.      Yes Historical Provider, MD   propranolol (INDERAL) 10 MG tablet Take 1 tablet by mouth 3 times daily 10/14/21 2/7/22  Isreal Jimenez MD    Scheduled Meds:  Continuous Infusions:  PRN Meds:.        Recent Laboratory Data-     Lab Results   Component Value Date    WBC 5.7 04/08/2022    HGB 15.9 04/08/2022    HCT 45.8 04/08/2022    MCV 89.8 04/08/2022     04/08/2022    LYMPHOPCT 22.3 04/08/2022    RBC 5.10 04/08/2022    MCH 31.2 04/08/2022    MCHC 34.7 (H) 04/08/2022    RDW 12.8 04/08/2022    NEUTOPHILPCT 61.3 04/08/2022    MONOPCT 13.6 (H) 04/08/2022    BASOPCT 1.2 04/08/2022    NEUTROABS 3.51 04/08/2022    LYMPHSABS 1.28 (L) 04/08/2022    MONOSABS 0.78 04/08/2022    EOSABS 0.08 04/08/2022    BASOSABS 0.07 04/08/2022       Lab Results   Component Value Date     04/08/2022    K 4.6 04/08/2022     04/08/2022    CO2 27 04/08/2022    BUN 11 04/08/2022    CREATININE 1.1 04/08/2022    GLUCOSE 96 04/08/2022    CALCIUM 9.4 04/08/2022    PROT 7.8 04/08/2022    LABALBU 4.2 04/08/2022    BILITOT 0.5 04/08/2022    ALKPHOS 62 04/08/2022    AST 27 04/08/2022    ALT 37 04/08/2022    LABGLOM >60 04/08/2022    GFRAA >60 04/08/2022         No results found for: IRON, TIBC, FERRITIN      No results found for:       No results found for: CEA          Radiology-  No results found. ASSESSMENT/PLAN:  A 57-year-old man with:    Hypertension  Hyperlipidemia  GERD  Covid pneumonia in January 2021  Acute bilateral PE in March 2021     No prior history of clotting disorder and no family history of thrombophilia.     It was explained to him that his bilateral pulmonary emboli were likely provoked by Covid. Thrombophilia panel and antiphospholipid antibody titers as well as D-dimer will be done today. When his work-up is completed he is likely to be recommended 1 full year of anticoagulation with Eliquis.        He will be recommended to receive the Covid vaccine. 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 It was explained to him that he is at high risk of venous thromboembolism and that Covid contributed to his worsening thrombophilia. He will be recommended folic acid supplements to optimize serum homocysteine level and decrease risk of premature atherosclerosis. Strict blood pressure control exercise and normal lipid profile were stressed. He will be recommended 1 year of anticoagulation with Eliquis and in April 2022 he will have a repeat D-dimer and if normal his Eliquis will be discontinued and he will be switched to baby aspirin. He has been cleared to receive his COVID-19 vaccine with RNA vaccines either Pfizer or Gumhouse      4/11/22  Doing very well. No cough or dyspnea. His labs are reviewed and are all normal and his exam is negative. His D-dimer is below 200. He has completed the whole year of anticoagulation. He has been on folic acid supplements and vitamin B complex. His Eliquis to be discontinued and he is to be switched to baby aspirin.   It was explained to him that by virtue of his thrombophilia with homozygous XOCHITL-1 mutation as well as heterozygote mutations for factor V Leiden and MTHFR 677 he is at risk of increased atherosclerotic heart disease and slight increase in venous thromboembolism. He will continue on his antihyperlipidemic agent and will follow with Dr. Agnieszka Batres with strict control of his lipidemia and blood pressure    He was encouraged to receive the mRNA Covid vaccines. Son Moy. Zak Cardenas M.D., F.A.C.P.   Electronically signed 4/11/2022 at 11:29 AM

## 2022-08-08 DIAGNOSIS — E78.5 DYSLIPIDEMIA: ICD-10-CM

## 2022-08-08 DIAGNOSIS — G43.709 CHRONIC MIGRAINE WITHOUT AURA WITHOUT STATUS MIGRAINOSUS, NOT INTRACTABLE: ICD-10-CM

## 2022-08-08 DIAGNOSIS — R73.09 ELEVATED GLUCOSE: ICD-10-CM

## 2022-08-08 DIAGNOSIS — I10 ESSENTIAL HYPERTENSION: ICD-10-CM

## 2022-08-08 LAB
ALBUMIN SERPL-MCNC: 4.6 G/DL (ref 3.5–5.2)
ALP BLD-CCNC: 55 U/L (ref 40–129)
ALT SERPL-CCNC: 28 U/L (ref 0–40)
ANION GAP SERPL CALCULATED.3IONS-SCNC: 16 MMOL/L (ref 7–16)
AST SERPL-CCNC: 22 U/L (ref 0–39)
BASOPHILS ABSOLUTE: 0.07 E9/L (ref 0–0.2)
BASOPHILS RELATIVE PERCENT: 1.6 % (ref 0–2)
BILIRUB SERPL-MCNC: 0.4 MG/DL (ref 0–1.2)
BUN BLDV-MCNC: 19 MG/DL (ref 6–20)
CALCIUM SERPL-MCNC: 9.5 MG/DL (ref 8.6–10.2)
CHLORIDE BLD-SCNC: 107 MMOL/L (ref 98–107)
CHOLESTEROL, TOTAL: 197 MG/DL (ref 0–199)
CO2: 22 MMOL/L (ref 22–29)
CREAT SERPL-MCNC: 1.2 MG/DL (ref 0.7–1.2)
EOSINOPHILS ABSOLUTE: 0.12 E9/L (ref 0.05–0.5)
EOSINOPHILS RELATIVE PERCENT: 2.8 % (ref 0–6)
GFR AFRICAN AMERICAN: >60
GFR NON-AFRICAN AMERICAN: >60 ML/MIN/1.73
GLUCOSE BLD-MCNC: 137 MG/DL (ref 74–99)
HBA1C MFR BLD: 5.5 % (ref 4–5.6)
HCT VFR BLD CALC: 45.1 % (ref 37–54)
HDLC SERPL-MCNC: 51 MG/DL
HEMOGLOBIN: 15.5 G/DL (ref 12.5–16.5)
IMMATURE GRANULOCYTES #: 0.01 E9/L
IMMATURE GRANULOCYTES %: 0.2 % (ref 0–5)
LDL CHOLESTEROL CALCULATED: 115 MG/DL (ref 0–99)
LYMPHOCYTES ABSOLUTE: 1.29 E9/L (ref 1.5–4)
LYMPHOCYTES RELATIVE PERCENT: 30.2 % (ref 20–42)
MCH RBC QN AUTO: 31.1 PG (ref 26–35)
MCHC RBC AUTO-ENTMCNC: 34.4 % (ref 32–34.5)
MCV RBC AUTO: 90.4 FL (ref 80–99.9)
MONOCYTES ABSOLUTE: 0.52 E9/L (ref 0.1–0.95)
MONOCYTES RELATIVE PERCENT: 12.2 % (ref 2–12)
NEUTROPHILS ABSOLUTE: 2.26 E9/L (ref 1.8–7.3)
NEUTROPHILS RELATIVE PERCENT: 53 % (ref 43–80)
PDW BLD-RTO: 13.2 FL (ref 11.5–15)
PLATELET # BLD: 321 E9/L (ref 130–450)
PMV BLD AUTO: 10 FL (ref 7–12)
POTASSIUM SERPL-SCNC: 4.7 MMOL/L (ref 3.5–5)
RBC # BLD: 4.99 E12/L (ref 3.8–5.8)
SODIUM BLD-SCNC: 145 MMOL/L (ref 132–146)
TOTAL PROTEIN: 7.8 G/DL (ref 6.4–8.3)
TRIGL SERPL-MCNC: 154 MG/DL (ref 0–149)
TSH SERPL DL<=0.05 MIU/L-ACNC: 1.21 UIU/ML (ref 0.27–4.2)
VLDLC SERPL CALC-MCNC: 31 MG/DL
WBC # BLD: 4.3 E9/L (ref 4.5–11.5)

## 2022-08-18 PROBLEM — I26.99 PULMONARY EMBOLISM, BILATERAL (HCC): Status: RESOLVED | Noted: 2021-03-03 | Resolved: 2022-08-18

## 2022-08-18 PROBLEM — R07.9 CHEST PAIN: Status: RESOLVED | Noted: 2021-02-15 | Resolved: 2022-08-18

## 2022-08-18 PROBLEM — D68.59 HYPERCOAGULOPATHY (HCC): Status: ACTIVE | Noted: 2022-08-18

## 2022-08-19 ENCOUNTER — TELEPHONE (OUTPATIENT)
Dept: SLEEP CENTER | Age: 52
End: 2022-08-19

## 2022-08-22 ENCOUNTER — TELEPHONE (OUTPATIENT)
Dept: SLEEP CENTER | Age: 52
End: 2022-08-22

## 2022-08-22 NOTE — TELEPHONE ENCOUNTER
Called patient to schedule sleep study, no answer, could not leave a message, voicemail was not set up yet

## 2022-12-29 NOTE — PROGRESS NOTES
Admitting Date and Time: 3/3/2021  4:01 PM  Admit Dx: Bilateral pulmonary embolism (HCC) [I26.99]  Pulmonary embolism, bilateral ([address] [I26.99]    Subjective:  Pt feels ok  Per RN: no issues    ROS: denies fever, chills, cp, sob, n/v, HA unless stated above.  sodium chloride flush  10 mL Intravenous 2 times per day         heparin (porcine), 80 Units/kg, PRN      heparin (porcine), 40 Units/kg, PRN      sodium chloride flush, 10 mL, PRN      promethazine, 12.5 mg, Q6H PRN    Or      ondansetron, 4 mg, Q6H PRN      polyethylene glycol, 17 g, Daily PRN      acetaminophen, 650 mg, Q6H PRN    Or      acetaminophen, 650 mg, Q6H PRN      perflutren lipid microspheres, 1.5 mL, ONCE PRN         Objective:    /86   Pulse 106   Temp 99.2 °F (37.3 °C) (Oral)   Resp 18   Ht 5' 9\" (1.753 m)   Wt 220 lb (99.8 kg)   SpO2 98%   BMI 32.49 kg/m²   General Appearance: alert and oriented to person, place and time and in no acute distress  Skin: warm and dry  Head: normocephalic and atraumatic  Eyes: pupils equal, round, and reactive to light, extraocular eye movements intact, conjunctivae normal  Neck: neck supple and non tender without mass   Pulmonary/Chest: clear to auscultation bilaterally- no wheezes, rales or rhonchi, normal air movement, no respiratory distress  Cardiovascular: normal rate, normal S1 and S2 and no carotid bruits  Abdomen: soft, non-tender, non-distended, normal bowel sounds, no masses or organomegaly  Extremities: no cyanosis, no clubbing and no  edema  Neurologic: no cranial nerve deficit and speech normal      Recent Labs     03/03/21  1443 03/03/21  1619   NA  --  139   K  --  4.4   CL  --  102   CO2  --  28   BUN  --  13   CREATININE 1.1 1.1   GLUCOSE  --  99   CALCIUM  --  9.7       No results for input(s): ALKPHOS, PROT, LABALBU, BILITOT, AST, ALT in the last 72 hours.     Recent Labs     03/03/21  1440 03/03/21  1619 03/04/21  0431   WBC 6.5 6.8 5.9   RBC 5.11 5.03 4.84   HGB 15.8 15.5 14.8   HCT 44.9 44.5 43.0   MCV 87.9 88.5 88.8   MCH 30.9 30.8 30.6   MCHC 35.2* 34.8* 34.4   RDW 12.6 12.6 12.9    351 325   MPV 9.3 9.2 9.4           Radiology:   CTA PULMONARY W CONTRAST   Final Result   Segmental and subsegmental pulmonary emboli to the right lower lobe and left   upper lobe. No large central pulmonary emboli. No evidence of RV strain. Findings reported to MODESTO Jiménez at 5:15 p.m. Assessment:  Active Problems:    Bilateral pulmonary embolism (HCC)    Pulmonary embolism, bilateral (HCC)  Resolved Problems:    * No resolved hospital problems. *      Plan:  52 y.o. male with a history of recent covid presents with PE. He was discharged from here on 216 after work-up for chest pain showed a negative stress test and a normal 2D echo. He had an IV in the right antecubital fossa at that time. 4 days later at that site he started having some irritation pain and swelling then it began to expand medially and it tracked like manner with additional swelling and pain but not erythema. So he went to urgent care finally today who did an ultrasound of the arm showing up official thrombophlebitis of the right basilic vein with no DVT. They sent him in to the ER here for a CTA which showed segmental as well as subsegmental PEs multiple. On further history he admits that the last almost 2 weeks he has had back pain at night he thought was positional.  Otherwise review of systems is negative as mentioned below    1. PE provoked from recent Covid. Still not hypoxic or shortness of breath he is on room air  Had eliquis x 1 in ED. Will keep on Heparin drip for now then transition to Eliquis  2D echo pending   2. Code Status: Full  3. DVT prophylaxis: Systemic anticoagulation       NOTE: This report was transcribed using voice recognition software. Every effort was made to ensure accuracy; however, inadvertent computerized transcription errors may be present. Electronically signed by Arnie Schaefer MD on 3/4/2021 at 9:40 AM [employer] Pregnancy [facility] Lactation Text: This medication is considered safe during pregnancy and breast feeding.

## 2023-01-26 DIAGNOSIS — G47.33 OSA (OBSTRUCTIVE SLEEP APNEA): Primary | ICD-10-CM

## 2023-01-27 ENCOUNTER — TELEPHONE (OUTPATIENT)
Dept: SLEEP CENTER | Age: 53
End: 2023-01-27

## 2023-01-30 ENCOUNTER — TELEPHONE (OUTPATIENT)
Dept: SLEEP CENTER | Age: 53
End: 2023-01-30

## 2023-02-23 ENCOUNTER — HOSPITAL ENCOUNTER (OUTPATIENT)
Dept: SLEEP CENTER | Age: 53
Discharge: HOME | End: 2023-02-23
Payer: COMMERCIAL

## 2023-02-23 DIAGNOSIS — G47.33 OSA (OBSTRUCTIVE SLEEP APNEA): ICD-10-CM

## 2023-02-23 PROCEDURE — 95800 SLP STDY UNATTENDED: CPT

## 2023-03-06 DIAGNOSIS — G47.33 OSA (OBSTRUCTIVE SLEEP APNEA): Primary | ICD-10-CM

## 2023-03-08 ENCOUNTER — TELEPHONE (OUTPATIENT)
Dept: SLEEP CENTER | Age: 53
End: 2023-03-08

## 2023-03-22 ENCOUNTER — HOSPITAL ENCOUNTER (EMERGENCY)
Age: 53
Discharge: HOME | End: 2023-03-22
Payer: COMMERCIAL

## 2023-03-22 VITALS
HEART RATE: 96 BPM | BODY MASS INDEX: 33.33 KG/M2 | OXYGEN SATURATION: 100 % | TEMPERATURE: 98.3 F | RESPIRATION RATE: 18 BRPM | HEIGHT: 69 IN | DIASTOLIC BLOOD PRESSURE: 99 MMHG | SYSTOLIC BLOOD PRESSURE: 134 MMHG | WEIGHT: 225 LBS

## 2023-03-22 DIAGNOSIS — J01.90 ACUTE SINUSITIS, RECURRENCE NOT SPECIFIED, UNSPECIFIED LOCATION: Primary | ICD-10-CM

## 2023-03-22 DIAGNOSIS — J40 BRONCHITIS: ICD-10-CM

## 2023-03-22 PROCEDURE — 99211 OFF/OP EST MAY X REQ PHY/QHP: CPT

## 2023-03-22 RX ORDER — ALBUTEROL SULFATE 90 UG/1
2 AEROSOL, METERED RESPIRATORY (INHALATION) EVERY 6 HOURS PRN
Qty: 18 G | Refills: 0 | Status: SHIPPED | OUTPATIENT
Start: 2023-03-22

## 2023-03-22 RX ORDER — DEXTROMETHORPHAN HYDROBROMIDE, GUAIFENESIN, PHENYLEPHRINE HYDROCHLORIDE 200; 5; 10 MG/1; MG/1; MG/1
TABLET, COATED ORAL
Qty: 20 TABLET | Refills: 0 | Status: SHIPPED | OUTPATIENT
Start: 2023-03-22

## 2023-03-22 RX ORDER — METHYLPREDNISOLONE 4 MG/1
TABLET ORAL
Qty: 21 TABLET | Refills: 0 | Status: SHIPPED | OUTPATIENT
Start: 2023-03-22

## 2023-03-22 RX ORDER — AMOXICILLIN AND CLAVULANATE POTASSIUM 875; 125 MG/1; MG/1
1 TABLET, FILM COATED ORAL 2 TIMES DAILY
Qty: 20 TABLET | Refills: 0 | Status: SHIPPED | OUTPATIENT
Start: 2023-03-22 | End: 2023-04-01

## 2023-03-22 ASSESSMENT — PAIN DESCRIPTION - PAIN TYPE: TYPE: ACUTE PAIN

## 2023-03-22 ASSESSMENT — PAIN - FUNCTIONAL ASSESSMENT: PAIN_FUNCTIONAL_ASSESSMENT: 0-10

## 2023-03-22 ASSESSMENT — PAIN SCALES - GENERAL: PAINLEVEL_OUTOF10: 4

## 2023-03-22 ASSESSMENT — PAIN DESCRIPTION - FREQUENCY: FREQUENCY: CONTINUOUS

## 2023-03-22 ASSESSMENT — PAIN DESCRIPTION - ONSET: ONSET: GRADUAL

## 2023-03-22 NOTE — ED PROVIDER NOTES
THIAMINE MONONITRATE 100 MG TABLET    Take 100 mg by mouth daily       ALLERGIES     Levaquin [levofloxacin]    FAMILYHISTORY       Family History   Problem Relation Age of Onset    Esophageal Cancer Father          age 47    Graves Disease Sister     Other Sister         hypoglcemia        SOCIAL HISTORY       Social History     Tobacco Use    Smoking status: Never    Smokeless tobacco: Never   Vaping Use    Vaping Use: Never used   Substance Use Topics    Alcohol use: Not Currently     Alcohol/week: 1.0 standard drink     Types: 1 Cans of beer per week    Drug use: No       SCREENINGS                         CIWA Assessment  BP: (!) 134/99  Heart Rate: 96           PHYSICAL EXAM  1 or more Elements     ED Triage Vitals [23 1735]   BP Temp Temp Source Heart Rate Resp SpO2 Height Weight   (!) 134/99 98.3 °F (36.8 °C) Infrared 96 18 100 % 5' 9\" (1.753 m) 225 lb (102.1 kg)       Physical Exam        Constitutional/General: Alert and oriented x3, sounds nasally congested. Head: Normocephalic and atraumatic  Eyes: conjunctiva normal, sclera non icteric  ENT:  Oropharynx clear, handling secretions, no trismus, no asymmetry of the posterior oropharynx or uvular edema, ear canals both have some cerumen present I was not able to visualize the TMs tenderness over the maxillary and frontal sinuses  Neck: Supple, full ROM,   Respiratory: Lungs clear to auscultation bilaterally, no wheezes, rales, or rhonchi. Not in respiratory distress, harsh cough noted  Cardiovascular:  Regular rate. Regular rhythm. No murmurs, no gallops, no rubs. 2+ distal pulses. Equal extremity pulses. Chest: No chest wall tenderness  GI:  Abdomen Soft, Non tender, Non distended. +BS. No rebound, guarding, or rigidity. No pulsatile masses. Musculoskeletal: Moves all extremities x 4. Integument: skin warm and dry. No rashes.    Neurologic: GCS 15, no focal deficits, symmetric strength 5/5 in the upper and lower extremities sinus pain and pressure. Since he has been going on over a week now I did put him on Augmentin, some Mucinex fast max, and for the bronchitis and Medrol pack and albuterol inhaler advised if he has fever or worsening symptoms he should return or see his doctor for further evaluation. I did want to test him for COVID he declined he said he had COVID in the past and he does not feel that he has COVID this time. CONSULTS: (Who and What was discussed)  None        I am the Primary Clinician of Record. FINAL IMPRESSION      1. Acute sinusitis, recurrence not specified, unspecified location    2.  Bronchitis          DISPOSITION/PLAN     DISPOSITION Decision To Discharge 03/22/2023 06:08:44 PM      PATIENT REFERRED TO:  Sun Cyr DO  [facility] [address] [id number]  [phone]    Schedule an appointment as soon as possible for a visit         DISCHARGE MEDICATIONS:  New Prescriptions    ALBUTEROL SULFATE HFA (VENTOLIN HFA) 108 (90 BASE) MCG/ACT INHALER    Inhale 2 puffs into the lungs every 6 hours as needed for Wheezing    AMOXICILLIN-CLAVULANATE (AUGMENTIN) 875-125 MG PER TABLET    Take 1 tablet by mouth 2 times daily for 10 days    METHYLPREDNISOLONE (MEDROL, RADHA,) 4 MG TABLET    Take as directed on package insert days 1-6    PHENYLEPHRINE-DM-GG (MUCINEX FAST-MAX CONGEST COUGH) 5- MG TABS    Take as directed on the package for cough or congestion       DISCONTINUED MEDICATIONS:  Discontinued Medications    No medications on file              (Please note that portions of this note were completed with a voice recognition program.  Efforts were made to edit the dictations but occasionally words are mis-transcribed.)    UMAIR Oreilly CNP (electronically signed)          UMAIR Oreilly CNP  03/22/23 [id number]

## 2023-04-07 ENCOUNTER — HOSPITAL ENCOUNTER (OUTPATIENT)
Dept: INFUSION THERAPY | Age: 53
Discharge: HOME | End: 2023-04-07
Payer: COMMERCIAL

## 2023-04-07 DIAGNOSIS — I26.99 ACUTE PULMONARY EMBOLISM WITHOUT ACUTE COR PULMONALE, UNSPECIFIED PULMONARY EMBOLISM TYPE (HCC): ICD-10-CM

## 2023-04-07 LAB
ALBUMIN SERPL-MCNC: 4.2 G/DL (ref 3.5–5.2)
ALP SERPL-CCNC: 59 U/L (ref 40–129)
ALT SERPL-CCNC: 42 U/L (ref 0–40)
ANION GAP SERPL CALCULATED.3IONS-SCNC: 9 MMOL/L (ref 7–16)
AST SERPL-CCNC: 28 U/L (ref 0–39)
BASOPHILS # BLD: 0.1 E9/L (ref 0–0.2)
BASOPHILS NFR BLD: 1.7 % (ref 0–2)
BILIRUB SERPL-MCNC: 0.5 MG/DL (ref 0–1.2)
BUN SERPL-MCNC: 12 MG/DL (ref 6–20)
CALCIUM SERPL-MCNC: 9.3 MG/DL (ref 8.6–10.2)
CHLORIDE SERPL-SCNC: 108 MMOL/L (ref 98–107)
CO2 SERPL-SCNC: 27 MMOL/L (ref 22–29)
CREAT SERPL-MCNC: 1 MG/DL (ref 0.7–1.2)
EOSINOPHIL # BLD: 0.11 E9/L (ref 0.05–0.5)
EOSINOPHIL NFR BLD: 1.9 % (ref 0–6)
ERYTHROCYTE [DISTWIDTH] IN BLOOD BY AUTOMATED COUNT: 13.3 FL (ref 11.5–15)
GLUCOSE SERPL-MCNC: 115 MG/DL (ref 74–99)
HCT VFR BLD AUTO: 44.7 % (ref 37–54)
HGB BLD-MCNC: 15.6 G/DL (ref 12.5–16.5)
IMM GRANULOCYTES # BLD: 0.01 E9/L
IMM GRANULOCYTES NFR BLD: 0.2 % (ref 0–5)
LYMPHOCYTES # BLD: 1.35 E9/L (ref 1.5–4)
LYMPHOCYTES NFR BLD: 23.6 % (ref 20–42)
MCH RBC QN AUTO: 30.6 PG (ref 26–35)
MCHC RBC AUTO-ENTMCNC: 34.9 % (ref 32–34.5)
MCV RBC AUTO: 87.8 FL (ref 80–99.9)
MONOCYTES # BLD: 0.59 E9/L (ref 0.1–0.95)
MONOCYTES NFR BLD: 10.3 % (ref 2–12)
NEUTROPHILS # BLD: 3.56 E9/L (ref 1.8–7.3)
NEUTS SEG NFR BLD: 62.3 % (ref 43–80)
PLATELET # BLD AUTO: 276 E9/L (ref 130–450)
PMV BLD AUTO: 9.3 FL (ref 7–12)
POTASSIUM SERPL-SCNC: 4.7 MMOL/L (ref 3.5–5)
PROT SERPL-MCNC: 7.3 G/DL (ref 6.4–8.3)
RBC # BLD AUTO: 5.09 E12/L (ref 3.8–5.8)
SODIUM SERPL-SCNC: 144 MMOL/L (ref 132–146)
WBC # BLD: 5.7 E9/L (ref 4.5–11.5)

## 2023-04-07 PROCEDURE — 85025 COMPLETE CBC W/AUTO DIFF WBC: CPT

## 2023-04-07 PROCEDURE — 80053 COMPREHEN METABOLIC PANEL: CPT

## 2023-04-07 PROCEDURE — 36415 COLL VENOUS BLD VENIPUNCTURE: CPT

## 2023-04-27 ENCOUNTER — TELEPHONE (OUTPATIENT)
Dept: SLEEP CENTER | Age: 53
End: 2023-04-27

## 2023-04-28 ENCOUNTER — HOSPITAL ENCOUNTER (OUTPATIENT)
Dept: SLEEP CENTER | Age: 53
Discharge: HOME | End: 2023-04-28
Payer: COMMERCIAL

## 2023-04-28 DIAGNOSIS — G47.33 OSA (OBSTRUCTIVE SLEEP APNEA): ICD-10-CM

## 2023-04-28 PROCEDURE — 95811 POLYSOM 6/>YRS CPAP 4/> PARM: CPT

## 2023-10-25 PROBLEM — G47.33 OSA (OBSTRUCTIVE SLEEP APNEA): Status: ACTIVE | Noted: 2023-10-25

## 2024-11-16 ENCOUNTER — HOSPITAL ENCOUNTER (EMERGENCY)
Age: 54
Discharge: HOME | End: 2024-11-16
Payer: COMMERCIAL

## 2024-11-16 VITALS
OXYGEN SATURATION: 97 % | DIASTOLIC BLOOD PRESSURE: 91 MMHG | WEIGHT: 215 LBS | RESPIRATION RATE: 20 BRPM | SYSTOLIC BLOOD PRESSURE: 132 MMHG | HEART RATE: 76 BPM | TEMPERATURE: 98.1 F | BODY MASS INDEX: 31.75 KG/M2

## 2024-11-16 DIAGNOSIS — J01.00 ACUTE NON-RECURRENT MAXILLARY SINUSITIS: Primary | ICD-10-CM

## 2024-11-16 PROCEDURE — 99211 OFF/OP EST MAY X REQ PHY/QHP: CPT

## 2024-11-16 ASSESSMENT — PAIN - FUNCTIONAL ASSESSMENT: PAIN_FUNCTIONAL_ASSESSMENT: NONE - DENIES PAIN

## 2024-11-16 NOTE — ED PROVIDER NOTES
Independent [facility] Visit.        [facility]  ED  Encounter Note  Admit Date/RoomTime: 2024 12:13 PM  ED Room:   NAME: Stas Nails  : 1970  MRN: [id number]  PCP: Deep Croft DO    CHIEF COMPLAINT     Cough (Coughing, congestion, headache, ear ache for 2 weeks. )    HISTORY OF PRESENT ILLNESS        Stas Nails is a 54 y.o. male who presents to the ED with complaints of cough, nasal congestion, sinus pressure, fever and chills that has been ongoing for the past 2 weeks.  Patient states no nausea, vomiting, or diarrhea.  Patient denies chest pain.  Patient denies shortness of breath.  Patient states mild sore throat.  Patient's biggest complaint is the nasal congestion and sinus pressures.  Patient states he works from home and is not concerned about COVID or influenza.  Patient is not concerned that he has pneumonia his disease does not feel short of breath and does not have chest pain.  Patient states symptoms appear mostly above the neck.    REVIEW OF SYSTEMS     Pertinent positives and negatives are stated within HPI, all other systems reviewed and are negative.    Past Medical History:  has a past medical history of Acid reflux, Lea esophagus, Chest pain, H/O cardiovascular stress test, Hiatal hernia, Hyperlipidemia, and Hypertension.  Surgical History:  has a past surgical history that includes sinus surgery; Colonoscopy (10/2017); and Upper gastrointestinal endoscopy (2019).  Social History:  reports that he has never smoked. He has never been exposed to tobacco smoke. He has never used smokeless tobacco. He reports that he does not currently use alcohol after a past usage of about 1.0 standard drink of alcohol per week. He reports that he does not use drugs.  Family History: family history includes Esophageal Cancer in his father; Graves Disease in his sister; Other in his sister.   Allergies: Levaquin [levofloxacin]  CURRENT MEDICATIONS       Discharge